# Patient Record
Sex: FEMALE | Race: WHITE | NOT HISPANIC OR LATINO | Employment: OTHER | URBAN - METROPOLITAN AREA
[De-identification: names, ages, dates, MRNs, and addresses within clinical notes are randomized per-mention and may not be internally consistent; named-entity substitution may affect disease eponyms.]

---

## 2017-01-09 ENCOUNTER — TELEPHONE (OUTPATIENT)
Dept: FAMILY MEDICINE | Facility: CLINIC | Age: 47
End: 2017-01-09

## 2017-01-09 DIAGNOSIS — F43.0 ACUTE STRESS REACTION: Primary | ICD-10-CM

## 2017-01-09 RX ORDER — DULOXETIN HYDROCHLORIDE 30 MG/1
CAPSULE, DELAYED RELEASE ORAL
Qty: 30 CAPSULE | Refills: 5 | Status: SHIPPED | OUTPATIENT
Start: 2017-01-09 | End: 2017-11-20

## 2017-01-09 NOTE — TELEPHONE ENCOUNTER
I will order a low dose cymbalta to take daily for 1 week, then every other day for 1 week, then off

## 2017-01-09 NOTE — TELEPHONE ENCOUNTER
Pt calling in,  Skipped taking Cymbalta past week 5 times due to weight gain, gained about 25 lbs past yr despite trying to eat better  Now feeling dizziness/vertigo, lightheaded, hard time focusing eyes, nausea, feels better when laying flat  Has gotten progressively worse  No depression    Wondering if there is anything that can help?    Route to SD to review and advise    Jossy Angulo RN, BS  Message handled by Nurse Triage .

## 2017-06-22 DIAGNOSIS — G47.00 PERSISTENT INSOMNIA: ICD-10-CM

## 2017-06-23 RX ORDER — ZOLPIDEM TARTRATE 5 MG/1
TABLET ORAL
Qty: 30 TABLET | Refills: 0 | Status: SHIPPED | OUTPATIENT
Start: 2017-06-23 | End: 2017-07-28

## 2017-06-23 NOTE — TELEPHONE ENCOUNTER
Controlled Substance Refill Request for zolpidem (AMBIEN) 5 MG tablet  Problem List Complete:  No     PROVIDER TO CONSIDER COMPLETION OF PROBLEM LIST AND OVERVIEW/CONTROLLED SUBSTANCE AGREEMENT    Last Written Prescription Date:  12/22/16  Last Fill Quantity: 30,   # refills: 3    Last Office Visit with [facility] primary care provider: 9/6/2016      Future Office visit:     Controlled substance agreement on file: No.     Processing:  Fax Rx to [facility] pharmacy   checked in past 6 months?  No, route to ZAYRA Franco  June 23, 2017  8:12 AM

## 2017-06-23 NOTE — TELEPHONE ENCOUNTER
Rx was faxed to [facility], pharmacy will notify patient when ready to be picked up.   Filed at  desk.     Palomo Jacobson   06/23/17

## 2017-06-23 NOTE — TELEPHONE ENCOUNTER
Routing refill request to provider for review/approval because:  Drug not on the [facility] refill protocol     Jossy Angulo RN, BS  Clinical Nurse Triage.

## 2017-07-28 DIAGNOSIS — G47.00 PERSISTENT INSOMNIA: ICD-10-CM

## 2017-07-28 RX ORDER — ZOLPIDEM TARTRATE 5 MG/1
TABLET ORAL
Qty: 30 TABLET | Refills: 0 | Status: SHIPPED | OUTPATIENT
Start: 2017-07-28 | End: 2017-08-29

## 2017-07-28 NOTE — TELEPHONE ENCOUNTER
Rx was faxed to [facility], pharmacy will notify patient when ready to be picked up.   Filed at  desk.     Palomo Jacobson   07/28/17

## 2017-07-28 NOTE — TELEPHONE ENCOUNTER
zolpidem (AMBIEN) 5 MG tablet      Last Written Prescription Date:  6/23/17  Last Fill Quantity: 30,   # refills: 0  Last Office Visit with [facility] prescribing provider: 9/6/2016    Future Office visit:       Routing refill request to provider for review/approval because:  Drug not on the [facility], [facility] or [facility] refill protocol or controlled substance    ZAYRA Jay  July 28, 2017  8:30 AM

## 2017-08-29 DIAGNOSIS — G47.00 PERSISTENT INSOMNIA: ICD-10-CM

## 2017-08-29 NOTE — TELEPHONE ENCOUNTER
zolpidem (AMBIEN) 5 MG tablet      Last Written Prescription Date:  7/28/17  Last Fill Quantity: 30,   # refills: 0  Last Office Visit with [facility] or [facility] prescribing provider: 9/6/2016    Future Office visit:       Routing refill request to provider for review/approval because:  Drug not on the [facility], [facility] or [facility] refill protocol or controlled substance    ZAYRA Jay  August 29, 2017  11:04 AM

## 2017-08-30 RX ORDER — ZOLPIDEM TARTRATE 5 MG/1
TABLET ORAL
Qty: 30 TABLET | Refills: 0 | Status: SHIPPED | OUTPATIENT
Start: 2017-08-30 | End: 2017-10-26

## 2017-09-07 DIAGNOSIS — G47.00 PERSISTENT INSOMNIA: ICD-10-CM

## 2017-09-07 RX ORDER — ZOLPIDEM TARTRATE 5 MG/1
TABLET ORAL
Qty: 30 TABLET | Refills: 0 | OUTPATIENT
Start: 2017-09-07

## 2017-09-07 NOTE — TELEPHONE ENCOUNTER
Pt called screaming at RN with pharmacy on another phone regarding refill on ambien.  Writer informed patient that she doesn't need to be rude and yell with this.  Pharmacy was given a verbal for rx as this was taken care of on 8/30/17.    Lucía Hector RN, BSN

## 2017-10-26 DIAGNOSIS — G47.00 PERSISTENT INSOMNIA: ICD-10-CM

## 2017-10-26 NOTE — TELEPHONE ENCOUNTER
zolpidem (AMBIEN) 5 MG tablet      Last Written Prescription Date:  8/30/17  Last Fill Quantity: 30,   # refills: 0  Future Office visit:   9/6/2016    From last Refill 9/7/17, was refused:  Lucía Hector RN      9/7/17 10:40 AM   Note      Pt called screaming at RN with pharmacy on another phone regarding refill on ambien.  Writer informed patient that she doesn't need to be rude and yell with this.  Pharmacy was given a verbal for rx as this was taken care of on 8/30/17.     Lucía Hector RN, BSN          Routing refill request to provider for review/approval because:  Drug not on the [facility], [facility] or [facility] refill protocol or controlled substance      ZAYRA Jay  October 26, 2017  10:17 AM

## 2017-10-26 NOTE — TELEPHONE ENCOUNTER
checked 10/26/2017:    09/07/2017 ZOLPIDEM TARTRATE 5 MG TABLET 30.00   07/28/2017 ZOLPIDEM TARTRATE 5 MG TABLET 30.00   06/23/2017 ZOLPIDEM TARTRATE 5 MG TABLET 30.00   05/12/2017 ZOLPIDEM TARTRATE 5 MG TABLET 30.00   02/27/2017 ZOLPIDEM TARTRATE 5 MG TABLET 30.00   01/25/2017 ZOLPIDEM TARTRATE 5 MG TABLET 30.00   12/22/2016 ZOLPIDEM TARTRATE 5 MG TABLET 30.00   12/15/2016 LORAZEPAM 1 MG TABLET 5.00   11/11/2016 ZOLPIDEM TARTRATE 5 MG TABLET 30.00     Amanda Salas RN

## 2017-10-27 PROBLEM — F13.20 SEDATIVE, HYPNOTIC OR ANXIOLYTIC DEPENDENCE (H): Status: ACTIVE | Noted: 2017-10-27

## 2017-10-27 RX ORDER — ZOLPIDEM TARTRATE 5 MG/1
TABLET ORAL
Qty: 30 TABLET | Refills: 3 | Status: SHIPPED | OUTPATIENT
Start: 2017-10-27 | End: 2017-11-20

## 2017-10-27 NOTE — TELEPHONE ENCOUNTER
Rx was faxed to [facility], pharmacy will notify patient when ready to be picked up.     Palomo Jacobson   10/27/17 1:50 PM

## 2017-11-20 ENCOUNTER — OFFICE VISIT (OUTPATIENT)
Dept: FAMILY MEDICINE | Facility: CLINIC | Age: 47
End: 2017-11-20
Payer: COMMERCIAL

## 2017-11-20 VITALS
OXYGEN SATURATION: 100 % | SYSTOLIC BLOOD PRESSURE: 122 MMHG | BODY MASS INDEX: 22.66 KG/M2 | DIASTOLIC BLOOD PRESSURE: 88 MMHG | RESPIRATION RATE: 16 BRPM | TEMPERATURE: 98.1 F | HEART RATE: 98 BPM | WEIGHT: 141 LBS | HEIGHT: 66 IN

## 2017-11-20 DIAGNOSIS — G47.00 PERSISTENT INSOMNIA: ICD-10-CM

## 2017-11-20 DIAGNOSIS — M25.512 LEFT ANTERIOR SHOULDER PAIN: ICD-10-CM

## 2017-11-20 DIAGNOSIS — B00.1 COLD SORE: ICD-10-CM

## 2017-11-20 DIAGNOSIS — F33.1 MAJOR DEPRESSIVE DISORDER, RECURRENT EPISODE, MODERATE (H): Primary | ICD-10-CM

## 2017-11-20 DIAGNOSIS — Z23 NEED FOR PROPHYLACTIC VACCINATION AND INOCULATION AGAINST INFLUENZA: ICD-10-CM

## 2017-11-20 PROCEDURE — 90471 IMMUNIZATION ADMIN: CPT | Performed by: FAMILY MEDICINE

## 2017-11-20 PROCEDURE — 90686 IIV4 VACC NO PRSV 0.5 ML IM: CPT | Performed by: FAMILY MEDICINE

## 2017-11-20 PROCEDURE — 99214 OFFICE O/P EST MOD 30 MIN: CPT | Mod: 25 | Performed by: FAMILY MEDICINE

## 2017-11-20 RX ORDER — ZOLPIDEM TARTRATE 5 MG/1
5 TABLET ORAL AT BEDTIME
Qty: 30 TABLET | Refills: 3 | Status: SHIPPED | OUTPATIENT
Start: 2017-11-20 | End: 2018-06-26

## 2017-11-20 RX ORDER — CYCLOBENZAPRINE HCL 5 MG
TABLET ORAL
Qty: 90 TABLET | Refills: 3 | Status: SHIPPED | OUTPATIENT
Start: 2017-11-20 | End: 2018-10-04

## 2017-11-20 RX ORDER — ACYCLOVIR 400 MG/1
400 TABLET ORAL 3 TIMES DAILY
Qty: 15 TABLET | Refills: 3 | Status: SHIPPED | OUTPATIENT
Start: 2017-11-20 | End: 2018-10-04

## 2017-11-20 RX ORDER — LORAZEPAM 1 MG/1
1 TABLET ORAL EVERY 6 HOURS PRN
Qty: 10 TABLET | Refills: 0 | Status: SHIPPED | OUTPATIENT
Start: 2017-11-20 | End: 2018-10-04

## 2017-11-20 ASSESSMENT — ANXIETY QUESTIONNAIRES
5. BEING SO RESTLESS THAT IT IS HARD TO SIT STILL: NEARLY EVERY DAY
7. FEELING AFRAID AS IF SOMETHING AWFUL MIGHT HAPPEN: MORE THAN HALF THE DAYS
GAD7 TOTAL SCORE: 19
2. NOT BEING ABLE TO STOP OR CONTROL WORRYING: MORE THAN HALF THE DAYS
3. WORRYING TOO MUCH ABOUT DIFFERENT THINGS: NEARLY EVERY DAY
1. FEELING NERVOUS, ANXIOUS, OR ON EDGE: NEARLY EVERY DAY
6. BECOMING EASILY ANNOYED OR IRRITABLE: NEARLY EVERY DAY

## 2017-11-20 ASSESSMENT — PATIENT HEALTH QUESTIONNAIRE - PHQ9
SUM OF ALL RESPONSES TO PHQ QUESTIONS 1-9: 22
5. POOR APPETITE OR OVEREATING: NEARLY EVERY DAY

## 2017-11-20 ASSESSMENT — PAIN SCALES - GENERAL: PAINLEVEL: NO PAIN (0)

## 2017-11-20 NOTE — MR AVS SNAPSHOT
After Visit Summary   11/20/2017    Narda Guerrero    MRN: [id number]           Patient Information     Date Of Birth          1970        Visit Information        Provider Department      11/20/2017 9:00 AM Pricilla Bob MD [facility]        Today's Diagnoses     Left anterior shoulder pain    -  1    Persistent insomnia        Cold sore        Need for prophylactic vaccination and inoculation against influenza        Major depressive disorder, recurrent episode, moderate (H)          Care Instructions    Ok to use e-visits or phone visits if no insurance          Follow-ups after your visit        Follow-up notes from your care team     Return in about 4 weeks (around 12/18/2017).      Who to contact     If you have questions or need follow up information about today's clinic visit or your schedule please contact [facility] directly at [phone].  Normal or non-critical lab and imaging results will be communicated to you by MyChart, letter or phone within 4 business days after the clinic has received the results. If you do not hear from us within 7 days, please contact the clinic through [employer] or phone. If you have a critical or abnormal lab result, we will notify you by phone as soon as possible.  Submit refill requests through [employer] or call your pharmacy and they will forward the refill request to us. Please allow 3 business days for your refill to be completed.          Additional Information About Your Visit        [employer] Information     [employer] gives you secure access to your electronic health record. If you see a primary care provider, you can also send messages to your care team and make appointments. If you have questions, please call your primary care clinic.  If you do not have a primary care provider, please call [phone] and they will assist you.        Care EveryWhere ID     This is your Care EveryWhere ID. This could be  "used by other organizations to access your [facility] medical records  [id number]        Your Vitals Were     Pulse Temperature Respirations Height Pulse Oximetry BMI (Body Mass Index)    98 98.1  F (36.7  C) (Oral) 16 5' 5.5\" (1.664 m) 100% 23.11 kg/m2       Blood Pressure from Last 3 Encounters:   11/20/17 122/88   09/06/16 120/80   10/02/15 100/82    Weight from Last 3 Encounters:   11/20/17 141 lb (64 kg)   09/06/16 152 lb (68.9 kg)   10/02/15 134 lb 9.6 oz (61.1 kg)              We Performed the Following     HC FLU VAC PRESRV FREE QUAD SPLIT VIR 3+YRS IM  [13732]     Vaccine Administration, Initial [35242]          Today's Medication Changes          These changes are accurate as of: 11/20/17  9:27 AM.  If you have any questions, ask your nurse or doctor.               Start taking these medicines.        Dose/Directions    sertraline 50 MG tablet   Commonly known as:  ZOLOFT   Used for:  Major depressive disorder, recurrent episode, moderate (H)   Started by:  Pricilla Bob MD        Take 1/2 tablet (25 mg) for 1-2 weeks, then increase to 1 tablet orally daily   Quantity:  30 tablet   Refills:  0         These medicines have changed or have updated prescriptions.        Dose/Directions    cyclobenzaprine 5 MG tablet   Commonly known as:  FLEXERIL   This may have changed:  See the new instructions.   Used for:  Persistent insomnia   Changed by:  Pricilla Bob MD        Take 1 tablet by mouth at  bedtime   Quantity:  90 tablet   Refills:  3       LORazepam 1 MG tablet   Commonly known as:  ATIVAN   This may have changed:    - how much to take  - how to take this  - when to take this  - reasons to take this  - additional instructions   Used for:  Persistent insomnia   Changed by:  Pricilla Bob MD        Dose:  1 mg   Take 1 tablet (1 mg) by mouth every 6 hours as needed for anxiety And/ orTake 1 tablet prior to dental work.   Quantity:  10 tablet   Refills:  0       zolpidem 5 MG " tablet   Commonly known as:  AMBIEN   This may have changed:  See the new instructions.   Used for:  Persistent insomnia   Changed by:  Pricilla Bob MD        Dose:  5 mg   Take 1 tablet (5 mg) by mouth At Bedtime   Quantity:  30 tablet   Refills:  3            Where to get your medicines      These medications were sent to [facility]/pharmacy #[id number] - [place], [place] - [address]  [address], [facility] [address]     Phone:  [phone]     acyclovir 400 MG tablet    cyclobenzaprine 5 MG tablet    sertraline 50 MG tablet         Some of these will need a paper prescription and others can be bought over the counter.  Ask your nurse if you have questions.     Bring a paper prescription for each of these medications     LORazepam 1 MG tablet    zolpidem 5 MG tablet                Primary Care Provider Office Phone # Fax #    Pricilla Bob -[phone]       [id number]   [facility] [address]        Equal Access to Services     [facility] AH: Hadii aad ku hadasho Soomaali, waaxda luqadaha, qaybta kaalmada adeegyada, waxay idiin hayaan cindi niño . So [facility] [phone].    ATENCIÓN: Si habla español, tiene a lopez disposición servicios gratuitos de asistencia lingüística. [facility] [phone].    We comply with applicable federal civil rights laws and [place] laws. We do not discriminate on the basis of race, color, national origin, age, disability, sex, sexual orientation, or gender identity.            Thank you!     Thank you for choosing [facility]  for your care. Our goal is always to provide you with excellent care. Hearing back from our patients is one way we can continue to improve our services. Please take a few minutes to complete the written survey that you may receive in the mail after your visit with us. Thank you!             Your Updated Medication List - Protect others around you: Learn how to safely use, store and throw  away your medicines at www.disposemymeds.org.          This list is accurate as of: 11/20/17  9:27 AM.  Always use your most recent med list.                   Brand Name Dispense Instructions for use Diagnosis    acyclovir 400 MG tablet    ZOVIRAX    15 tablet    Take 1 tablet (400 mg) by mouth 3 times daily    Cold sore       cyclobenzaprine 5 MG tablet    FLEXERIL    90 tablet    Take 1 tablet by mouth at  bedtime    Persistent insomnia       fexofenadine 180 MG tablet    ALLEGRA     Take 180 mg by mouth daily.        LORazepam 1 MG tablet    ATIVAN    10 tablet    Take 1 tablet (1 mg) by mouth every 6 hours as needed for anxiety And/ orTake 1 tablet prior to dental work.    Persistent insomnia       sertraline 50 MG tablet    ZOLOFT    30 tablet    Take 1/2 tablet (25 mg) for 1-2 weeks, then increase to 1 tablet orally daily    Major depressive disorder, recurrent episode, moderate (H)       zolpidem 5 MG tablet    AMBIEN    30 tablet    Take 1 tablet (5 mg) by mouth At Bedtime    Persistent insomnia

## 2017-11-20 NOTE — NURSING NOTE
"Chief Complaint   Patient presents with     MOOD CHANGES     Refill Request     Flu Shot     Initial /88 (BP Location: Right arm, Patient Position: Chair, Cuff Size: Adult Regular)  Pulse 98  Temp 98.1  F (36.7  C) (Oral)  Resp 16  Ht 5' 5.5\" (1.664 m)  Wt 141 lb (64 kg)  SpO2 100%  BMI 23.11 kg/m2 Estimated body mass index is 23.11 kg/(m^2) as calculated from the following:    Height as of this encounter: 5' 5.5\" (1.664 m).    Weight as of this encounter: 141 lb (64 kg).  BP completed using cuff size regular right arm.    Lisa Magill, CMA    "

## 2017-11-21 ASSESSMENT — ANXIETY QUESTIONNAIRES: GAD7 TOTAL SCORE: 19

## 2017-11-29 ENCOUNTER — TELEPHONE (OUTPATIENT)
Dept: FAMILY MEDICINE | Facility: CLINIC | Age: 47
End: 2017-11-29

## 2017-12-02 ENCOUNTER — TRANSFERRED RECORDS (OUTPATIENT)
Dept: HEALTH INFORMATION MANAGEMENT | Facility: CLINIC | Age: 47
End: 2017-12-02

## 2018-01-19 ENCOUNTER — TELEPHONE (OUTPATIENT)
Dept: FAMILY MEDICINE | Facility: CLINIC | Age: 48
End: 2018-01-19

## 2018-01-19 NOTE — LETTER
[facility]  [address] [place] [address], [address]  [facility] [id number] [phone]  January 31, 2018    Narda ANDREW Guerrero  [address]  [facility] [address]      Dear Narda,    I care about your health and have reviewed your health plan.  I have reviewed your medical conditions, medication list, and lab results and am making recommendations  based on this review, to better manage your health.    You are particularly in need of attention regarding:  -Depression, -Cervical Cancer Screening and -Wellness (Physical) Visit    I am recommending that you:  -schedule a WELLNESS (Physical) APPOINTMENT with me.   (If you go elsewhere for Wellness visits then please disregard this reminder.)      Here is a list of Health Maintenance topics that are due now or due soon:  Health Maintenance Due   Topic Date Due     DEPRESSION ACTION PLAN Q1 YR  09/06/2017     PAP Q3 YR  07/28/2018       Please call us at [phone] (or use [employer]) to address the above   recommendations.    Thank you for trusting [facility] and we appreciate the opportunity to serve you.  We look forward to supporting your healthcare needs in the future.    Healthy Regards,    Pricilla Bob MD

## 2018-01-19 NOTE — TELEPHONE ENCOUNTER
Panel Management Review      Patient has the following on her problem list:     Depression / Dysthymia review    Measure:  Needs PHQ-9 score of 4 or less during index window.  Administer PHQ-9 and if score is 5 or more, send encounter to provider for next steps.    5 - 7 month window range: DUE NOW    PHQ-9 SCORE 10/2/2015 4/14/2016 11/20/2017   Total Score - - -   Total Score 22 20 22       If PHQ-9 recheck is 5 or more, route to provider for next steps.    Patient is due for:  PHQ-9 and SUPRIYA-7        Composite cancer screening  Chart review shows that this patient is due/due soon for the following None  Summary:    Patient is due/failing the following:   FOLLOW UP    Action needed:   Patient needs office visit for DEPRESSION.    Type of outreach:    Sent [employer] message.    Questions for provider review:    None                                                                                                                                    Lisa Magill, CMA       Chart routed to Care Team .

## 2018-05-18 ENCOUNTER — TELEPHONE (OUTPATIENT)
Dept: FAMILY MEDICINE | Facility: CLINIC | Age: 48
End: 2018-05-18

## 2018-05-18 NOTE — LETTER
[facility]  [address] [place] [address], [address]  [facility] [id number] [phone]  May 25, 2018    Narda Guerrero  [address]  [facility] [address]      Dear Narda,    I care about your health and have reviewed your health plan.  I have reviewed your medical conditions, medication list, and lab results and am making recommendations  based on this review, to better manage your health.    You are particularly in need of attention regarding:  -Depression, -Cervical Cancer Screening and -Wellness (Physical) Visit    I am recommending that you:  -schedule a WELLNESS (Physical) APPOINTMENT with me.          Here is a list of Health Maintenance topics that are due now or due soon:  Health Maintenance Due   Topic Date Due     HIV SCREEN (SYSTEM ASSIGNED)  07/08/1990     DEPRESSION ACTION PLAN Q1 YR  09/06/2017     PHQ-9 Q6 MONTHS  05/20/2018     PAP Q3 YR  07/28/2018       Please call us at [phone] (or use [employer]) to address the above   recommendations.    Thank you for trusting [facility] and we appreciate the opportunity to serve you.  We look forward to supporting your healthcare needs in the future.    Healthy Regards,     Pricilla Bob MD

## 2018-05-18 NOTE — TELEPHONE ENCOUNTER
Panel Management Review      Patient has the following on her problem list:     Depression / Dysthymia review    Measure:  Needs PHQ-9 score of 4 or less during index window.  Administer PHQ-9 and if score is 5 or more, send encounter to provider for next steps.    5 - 7 month window range: PHQ-9 due NOW     PHQ-9 SCORE 10/2/2015 4/14/2016 11/20/2017   Total Score - - -   Total Score 22 20 22       If PHQ-9 recheck is 5 or more, route to provider for next steps.    Patient is due for:  PHQ9      Composite cancer screening  Chart review shows that this patient is due/due soon for the following Pap Smear due 07/28/18   Summary:    Patient is due/failing the following:   PAP, PHQ9 and PHYSICAL    Action needed:   Patient needs office visit for physical and pap.  Also needs to do PHQ9.    Type of outreach:    Attempted calling patient, NO ANSWER and UNABLE TO LEAVE A MESSAGE.  Will try again later.      Questions for provider review:    None                                                                                                                                    Lisa Magill, CMA       Chart routed to Care Team .

## 2018-06-18 DIAGNOSIS — G47.00 PERSISTENT INSOMNIA: ICD-10-CM

## 2018-06-18 NOTE — TELEPHONE ENCOUNTER
zolpidem (AMBIEN) 5 MG tablet  Last Written Prescription Date:  11/20/17  Last Fill Quantity: 30,  # refills: 3   Last office visit: 11/20/2017 with prescribing provider:  Paulino Samson refill request to provider for review/approval because:  Drug not on the [facility], [facility] or [facility] refill protocol or controlled substance

## 2018-06-21 RX ORDER — ZOLPIDEM TARTRATE 5 MG/1
5 TABLET ORAL AT BEDTIME
Qty: 30 TABLET | Refills: 3 | OUTPATIENT
Start: 2018-06-21

## 2018-06-26 ENCOUNTER — VIRTUAL VISIT (OUTPATIENT)
Dept: FAMILY MEDICINE | Facility: CLINIC | Age: 48
End: 2018-06-26
Payer: COMMERCIAL

## 2018-06-26 DIAGNOSIS — G47.00 PERSISTENT INSOMNIA: ICD-10-CM

## 2018-06-26 PROCEDURE — 99441 ZZC PHYSICIAN TELEPHONE EVALUATION 5-10 MIN: CPT | Performed by: FAMILY MEDICINE

## 2018-06-26 RX ORDER — DIPHENHYDRAMINE HCL 25 MG
25 TABLET ORAL EVERY 6 HOURS PRN
COMMUNITY
End: 2021-11-29

## 2018-06-26 RX ORDER — ZOLPIDEM TARTRATE 5 MG/1
5 TABLET ORAL AT BEDTIME
Qty: 30 TABLET | Refills: 3 | Status: SHIPPED | OUTPATIENT
Start: 2018-06-26 | End: 2018-10-04

## 2018-06-26 ASSESSMENT — ANXIETY QUESTIONNAIRES
6. BECOMING EASILY ANNOYED OR IRRITABLE: MORE THAN HALF THE DAYS
1. FEELING NERVOUS, ANXIOUS, OR ON EDGE: SEVERAL DAYS
3. WORRYING TOO MUCH ABOUT DIFFERENT THINGS: MORE THAN HALF THE DAYS
GAD7 TOTAL SCORE: 11
7. FEELING AFRAID AS IF SOMETHING AWFUL MIGHT HAPPEN: SEVERAL DAYS
5. BEING SO RESTLESS THAT IT IS HARD TO SIT STILL: SEVERAL DAYS
IF YOU CHECKED OFF ANY PROBLEMS ON THIS QUESTIONNAIRE, HOW DIFFICULT HAVE THESE PROBLEMS MADE IT FOR YOU TO DO YOUR WORK, TAKE CARE OF THINGS AT HOME, OR GET ALONG WITH OTHER PEOPLE: SOMEWHAT DIFFICULT
2. NOT BEING ABLE TO STOP OR CONTROL WORRYING: MORE THAN HALF THE DAYS

## 2018-06-26 ASSESSMENT — PATIENT HEALTH QUESTIONNAIRE - PHQ9: 5. POOR APPETITE OR OVEREATING: MORE THAN HALF THE DAYS

## 2018-06-26 NOTE — LETTER
My Depression Action Plan  Name: Narda Guerrero   Date of Birth 1970  Date: 6/26/2018    My doctor: Pricilla Bob   My clinic: [address], [address]  [facility] [address] [phone]          GREEN    ZONE   Good Control    What it looks like:     Things are going generally well. You have normal up s and down s. You may even feel depressed from time to time, but bad moods usually last less than a day.   What you need to do:  1. Continue to care for yourself (see self care plan)  2. Check your depression survival kit and update it as needed  3. Follow your physician s recommendations including any medication.  4. Do not stop taking medication unless you consult with your physician first.           YELLOW         ZONE Getting Worse    What it looks like:     Depression is starting to interfere with your life.     It may be hard to get out of bed; you may be starting to isolate yourself from others.    Symptoms of depression are starting to last most all day and this has happened for several days.     You may have suicidal thoughts but they are not constant.   What you need to do:     1. Call your care team, your response to treatment will improve if you keep your care team informed of your progress. Yellow periods are signs an adjustment may need to be made.     2. Continue your self-care, even if you have to fake it!    3. Talk to someone in your support network    4. Open up your depression survival kit           RED    ZONE Medical Alert - Get Help    What it looks like:     Depression is seriously interfering with your life.     You may experience these or other symptoms: You can t get out of bed most days, can t work or engage in other necessary activities, you have trouble taking care of basic hygiene, or basic responsibilities, thoughts of suicide or death that will not go away, self-injurious behavior.     What you need to do:  1. Call  your care team and request a same-day appointment. If they are not available (weekends or after hours) call your local crisis line, emergency room or 911.            Depression Self Care Plan / Survival Kit    Self-Care for Depression  Here s the deal. Your body and mind are really not as separate as most people think.  What you do and think affects how you feel and how you feel influences what you do and think. This means if you do things that people who feel good do, it will help you feel better.  Sometimes this is all it takes.  There is also a place for medication and therapy depending on how severe your depression is, so be sure to consult with your medical provider and/ or Behavioral Health Consultant if your symptoms are worsening or not improving.     In order to better manage my stress, I will:    Exercise  Get some form of exercise, every day. This will help reduce pain and release endorphins, the  feel good  chemicals in your brain. This is almost as good as taking antidepressants!  This is not the same as joining a gym and then never going! (they count on that by the way ) It can be as simple as just going for a walk or doing some gardening, anything that will get you moving.      Hygiene   Maintain good hygiene (Get out of bed in the morning, Make your bed, Brush your teeth, Take a shower, and Get dressed like you were going to work, even if you are unemployed).  If your clothes don't fit try to get ones that do.    Diet  I will strive to eat foods that are good for me, drink plenty of water, and avoid excessive sugar, caffeine, alcohol, and other mood-altering substances.  Some foods that are helpful in depression are: complex carbohydrates, B vitamins, flaxseed, fish or fish oil, fresh fruits and vegetables.    Psychotherapy  I agree to participate in Individual Therapy (if recommended).    Medication  If prescribed medications, I agree to take them.  Missing doses can result in serious side effects.   I understand that drinking alcohol, or other illicit drug use, may cause potential side effects.  I will not stop my medication abruptly without first discussing it with my provider.    Staying Connected With Others  I will stay in touch with my friends, family members, and my primary care provider/team.    Use your imagination  Be creative.  We all have a creative side; it doesn t matter if it s oil painting, sand castles, or mud pies! This will also kick up the endorphins.    Witness Beauty  (AKA stop and smell the roses) Take a look outside, even in mid-winter. Notice colors, textures. Watch the squirrels and birds.     Service to others  Be of service to others.  There is always someone else in need.  By helping others we can  get out of ourselves  and remember the really important things.  This also provides opportunities for practicing all the other parts of the program.    Humor  Laugh and be silly!  Adjust your TV habits for less news and crime-drama and more comedy.    Control your stress  Try breathing deep, massage therapy, biofeedback, and meditation. Find time to relax each day.     My support system    Clinic Contact:  Phone number:    Contact 1:  Phone number:    Contact 2:  Phone number:    [place]/:  Phone number:    Therapist:  Phone number:    [facility] crisis center:    Phone number:    Other community support:  Phone number:

## 2018-06-26 NOTE — MR AVS SNAPSHOT
After Visit Summary   6/26/2018    Narda Guerrero    MRN: [id number]           Patient Information     Date Of Birth          1970        Visit Information        Provider Department      6/26/2018 9:00 AM Pricilla Bob MD [facility]        Today's Diagnoses     Persistent insomnia           Follow-ups after your visit        Follow-up notes from your care team     Return in about 3 months (around 9/26/2018).      Who to contact     If you have questions or need follow up information about today's clinic visit or your schedule please contact [facility] directly at [phone].  Normal or non-critical lab and imaging results will be communicated to you by [employer], letter or phone within 4 business days after the clinic has received the results. If you do not hear from us within 7 days, please contact the clinic through [employer] or phone. If you have a critical or abnormal lab result, we will notify you by phone as soon as possible.  Submit refill requests through [employer] or call your pharmacy and they will forward the refill request to us. Please allow 3 business days for your refill to be completed.          Additional Information About Your Visit        MyChart Information     [employer] gives you secure access to your electronic health record. If you see a primary care provider, you can also send messages to your care team and make appointments. If you have questions, please call your primary care clinic.  If you do not have a primary care provider, please call [phone] and they will assist you.        Care EveryWhere ID     This is your Care EveryWhere ID. This could be used by other organizations to access your [place] medical records  [id number]         Blood Pressure from Last 3 Encounters:   11/20/17 122/88   09/06/16 120/80   10/02/15 100/82    Weight from Last 3 Encounters:   11/20/17 141 lb (64 kg)   09/06/16 152 lb (68.9 kg)   10/02/15  134 lb 9.6 oz (61.1 kg)              Today, you had the following     No orders found for display         Where to get your medicines      Some of these will need a paper prescription and others can be bought over the counter.  Ask your nurse if you have questions.     Bring a paper prescription for each of these medications     zolpidem 5 MG tablet          Primary Care Provider Office Phone # Fax #    Pricilla Dianelys Bob [phone]       [id number]  [address]   [facility] [address]        Equal Access to Services     KELSI MONIQUE : Hadii aad ku hadasho Soomaali, waaxda luqadaha, qaybta kaalmada adeegyada, waxay idiin hayaan cindi niño . So [facility] [phone].    ATENCIÓN: Si habla español, tiene a lopez disposición servicios gratuitos de asistencia lingüística. Llame al [phone].    We comply with applicable federal civil rights laws and [place] laws. We do not discriminate on the basis of race, color, national origin, age, disability, sex, sexual orientation, or gender identity.            Thank you!     Thank you for choosing [facility]  for your care. Our goal is always to provide you with excellent care. Hearing back from our patients is one way we can continue to improve our services. Please take a few minutes to complete the written survey that you may receive in the mail after your visit with us. Thank you!             Your Updated Medication List - Protect others around you: Learn how to safely use, store and throw away your medicines at www.disposemymeds.org.          This list is accurate as of 6/26/18  9:31 AM.  Always use your most recent med list.                   Brand Name Dispense Instructions for use Diagnosis    acyclovir 400 MG tablet    ZOVIRAX    15 tablet    Take 1 tablet (400 mg) by mouth 3 times daily    Cold sore       BENADRYL 25 MG tablet   Generic drug:  diphenhydrAMINE      Take 25 mg by mouth every 6 hours as needed for itching or  allergies        cyclobenzaprine 5 MG tablet    FLEXERIL    90 tablet    Take 1 tablet by mouth at  bedtime    Persistent insomnia       LORazepam 1 MG tablet    ATIVAN    10 tablet    Take 1 tablet (1 mg) by mouth every 6 hours as needed for anxiety And/ orTake 1 tablet prior to dental work.    Persistent insomnia       zolpidem 5 MG tablet    AMBIEN    30 tablet    Take 1 tablet (5 mg) by mouth At Bedtime    Persistent insomnia

## 2018-06-26 NOTE — PROGRESS NOTES
"Narda Guerrero is a 47 year old female who is being evaluated via a telephone visit.      The patient has been notified of following:     \"This telephone visit will be conducted via a call between you and your physician/provider. We have found that certain health care needs can be provided without the need for a physical exam.  This service lets us provide the care you need with a short phone conversation.  If a prescription is necessary we can send it directly to your pharmacy.  If lab work is needed we can place an order for that and you can then stop by our lab to have the test done at a later time.    We will bill your insurance company for this service.  Please check with your medical insurance if this type of visit is covered. You may be responsible for the cost of this type of visit if insurance coverage is denied.  The typical cost is $30 (10min), $59 (11-20min) and $85 (21-30min).  Most often these visits are shorter than 10 minutes.    If during the course of the call the physician/provider feels a telephone visit is not appropriate, you will not be charged for this service.\"       Consent has been obtained for this service by care team member: yes.   See the scanned image in the medical record.    Narda Guerrero complains of  Telephone and Recheck Medication (Ambien)    PHQ-9 SCORE 4/14/2016 11/20/2017 6/26/2018   Total Score - - -   Total Score 20 22 22     SUPRIYA-7 SCORE 4/14/2016 11/20/2017 6/26/2018   Total Score - - -   Total Score 14 19 11       I have reviewed and updated the patient's Past Medical History, Social History, Family History and Medication List.    ALLERGIES  Ortho tri-cyclen [ortho tri-cyclen lo] and Sulfa drugs    April Cox MA   (MA signature)    Additional provider notes: pt using ambien most nights, taking 1/3 to 1/2 tab only. This helps her sleep and sometimes will need to repeat the tab in the middle fo the night, using 5mg tabs to begin with.  Flexeril is sometimes " used as well. No caffeine use. No other concerns.  Insurance is now very high deductible and has no HSA. Due for pap next month    Assessment/Plan:  (G47.00) Persistent insomnia  Comment: refilled medication. Careful to not take this to late in the night, next day drowsiness can be a problem  Plan: zolpidem (AMBIEN) 5 MG tablet        Recommend Follow up for WWE in 3 months time, due for pap soon        I have reviewed the note as documented above.  This accurately captures the substance of my conversation with the patient,  [facility], MD          Total time of call between patient and provider was 5 minutes

## 2018-06-27 ASSESSMENT — ANXIETY QUESTIONNAIRES: GAD7 TOTAL SCORE: 11

## 2018-06-27 ASSESSMENT — PATIENT HEALTH QUESTIONNAIRE - PHQ9: SUM OF ALL RESPONSES TO PHQ QUESTIONS 1-9: 22

## 2018-09-10 ENCOUNTER — TELEPHONE (OUTPATIENT)
Dept: NURSING | Facility: CLINIC | Age: 48
End: 2018-09-10

## 2018-09-10 NOTE — TELEPHONE ENCOUNTER
Panel Management Review      Patient has the following on her problem list:     Depression / Dysthymia review    Measure:  Needs PHQ-9 score of 4 or less during index window.  Administer PHQ-9 and if score is 5 or more, send encounter to provider for next steps.    5 - 7 month window range: PHQ-9 and SUPRIYA-7 due NOW    PHQ-9 SCORE 4/14/2016 11/20/2017 6/26/2018   Total Score - - -   Total Score 20 22 22       If PHQ-9 recheck is 5 or more, route to provider for next steps.    Patient is due for:  PHQ9 and SUPRIYA-7      Composite cancer screening  Chart review shows that this patient is due/due soon for the following Pap Smear  Summary:    Patient is due/failing the following:   SUPRIYA-7, PAP, PHQ9 and PHYSICAL    Action needed:   Patient needs office visit for PHYSICAL AND PAP and also needs to do PHQ-9 and SUPRIYA-7.    Type of outreach:    Phone, spoke to patient.  assisted patient in making a physical and pap appointment on 10/04/18.     Questions for provider review:    None                                                                                                                                    Lisa Magill, CMA       Chart routed to Provider .

## 2018-09-29 ENCOUNTER — HEALTH MAINTENANCE LETTER (OUTPATIENT)
Age: 48
End: 2018-09-29

## 2018-10-04 ENCOUNTER — OFFICE VISIT (OUTPATIENT)
Dept: FAMILY MEDICINE | Facility: CLINIC | Age: 48
End: 2018-10-04
Payer: COMMERCIAL

## 2018-10-04 VITALS
HEART RATE: 80 BPM | SYSTOLIC BLOOD PRESSURE: 122 MMHG | WEIGHT: 147.3 LBS | HEIGHT: 66 IN | BODY MASS INDEX: 23.67 KG/M2 | RESPIRATION RATE: 12 BRPM | DIASTOLIC BLOOD PRESSURE: 86 MMHG | TEMPERATURE: 98.5 F

## 2018-10-04 DIAGNOSIS — B00.1 COLD SORE: ICD-10-CM

## 2018-10-04 DIAGNOSIS — F13.20 SEDATIVE, HYPNOTIC OR ANXIOLYTIC DEPENDENCE (H): ICD-10-CM

## 2018-10-04 DIAGNOSIS — E78.5 HYPERLIPIDEMIA WITH TARGET LDL LESS THAN 100: ICD-10-CM

## 2018-10-04 DIAGNOSIS — G47.00 PERSISTENT INSOMNIA: ICD-10-CM

## 2018-10-04 DIAGNOSIS — Z00.00 ROUTINE GENERAL MEDICAL EXAMINATION AT A HEALTH CARE FACILITY: Primary | ICD-10-CM

## 2018-10-04 DIAGNOSIS — Z23 NEED FOR PROPHYLACTIC VACCINATION AND INOCULATION AGAINST INFLUENZA: ICD-10-CM

## 2018-10-04 DIAGNOSIS — Z12.4 SCREENING FOR MALIGNANT NEOPLASM OF CERVIX: ICD-10-CM

## 2018-10-04 DIAGNOSIS — F33.2 SEVERE EPISODE OF RECURRENT MAJOR DEPRESSIVE DISORDER, WITHOUT PSYCHOTIC FEATURES (H): ICD-10-CM

## 2018-10-04 LAB
ALBUMIN SERPL-MCNC: 4.2 G/DL (ref 3.4–5)
ALP SERPL-CCNC: 108 U/L (ref 40–150)
ALT SERPL W P-5'-P-CCNC: 50 U/L (ref 0–50)
ANION GAP SERPL CALCULATED.3IONS-SCNC: 5 MMOL/L (ref 3–14)
AST SERPL W P-5'-P-CCNC: 28 U/L (ref 0–45)
BILIRUB SERPL-MCNC: 0.3 MG/DL (ref 0.2–1.3)
BUN SERPL-MCNC: 9 MG/DL (ref 7–30)
CALCIUM SERPL-MCNC: 8.9 MG/DL (ref 8.5–10.1)
CHLORIDE SERPL-SCNC: 104 MMOL/L (ref 94–109)
CHOLEST SERPL-MCNC: 251 MG/DL
CO2 SERPL-SCNC: 28 MMOL/L (ref 20–32)
CREAT SERPL-MCNC: 0.75 MG/DL (ref 0.52–1.04)
ERYTHROCYTE [DISTWIDTH] IN BLOOD BY AUTOMATED COUNT: 11.8 % (ref 10–15)
GFR SERPL CREATININE-BSD FRML MDRD: 83 ML/MIN/1.7M2
GLUCOSE SERPL-MCNC: 90 MG/DL (ref 70–99)
HCT VFR BLD AUTO: 46.8 % (ref 35–47)
HDLC SERPL-MCNC: 53 MG/DL
HGB BLD-MCNC: 15.5 G/DL (ref 11.7–15.7)
LDLC SERPL CALC-MCNC: 150 MG/DL
MCH RBC QN AUTO: 31.5 PG (ref 26.5–33)
MCHC RBC AUTO-ENTMCNC: 33.1 G/DL (ref 31.5–36.5)
MCV RBC AUTO: 95 FL (ref 78–100)
NONHDLC SERPL-MCNC: 198 MG/DL
PLATELET # BLD AUTO: 242 10E9/L (ref 150–450)
POTASSIUM SERPL-SCNC: 4.5 MMOL/L (ref 3.4–5.3)
PROT SERPL-MCNC: 8.2 G/DL (ref 6.8–8.8)
RBC # BLD AUTO: 4.92 10E12/L (ref 3.8–5.2)
SODIUM SERPL-SCNC: 137 MMOL/L (ref 133–144)
TRIGL SERPL-MCNC: 240 MG/DL
TSH SERPL DL<=0.005 MIU/L-ACNC: 1.3 MU/L (ref 0.4–4)
WBC # BLD AUTO: 6.2 10E9/L (ref 4–11)

## 2018-10-04 PROCEDURE — 80053 COMPREHEN METABOLIC PANEL: CPT | Performed by: FAMILY MEDICINE

## 2018-10-04 PROCEDURE — G0145 SCR C/V CYTO,THINLAYER,RESCR: HCPCS | Performed by: FAMILY MEDICINE

## 2018-10-04 PROCEDURE — 82306 VITAMIN D 25 HYDROXY: CPT | Performed by: FAMILY MEDICINE

## 2018-10-04 PROCEDURE — 80061 LIPID PANEL: CPT | Performed by: FAMILY MEDICINE

## 2018-10-04 PROCEDURE — 85027 COMPLETE CBC AUTOMATED: CPT | Performed by: FAMILY MEDICINE

## 2018-10-04 PROCEDURE — 36415 COLL VENOUS BLD VENIPUNCTURE: CPT | Performed by: FAMILY MEDICINE

## 2018-10-04 PROCEDURE — 99396 PREV VISIT EST AGE 40-64: CPT | Mod: 25 | Performed by: FAMILY MEDICINE

## 2018-10-04 PROCEDURE — 99213 OFFICE O/P EST LOW 20 MIN: CPT | Mod: 25 | Performed by: FAMILY MEDICINE

## 2018-10-04 PROCEDURE — 84443 ASSAY THYROID STIM HORMONE: CPT | Performed by: FAMILY MEDICINE

## 2018-10-04 PROCEDURE — 90471 IMMUNIZATION ADMIN: CPT | Performed by: FAMILY MEDICINE

## 2018-10-04 PROCEDURE — 87624 HPV HI-RISK TYP POOLED RSLT: CPT | Performed by: FAMILY MEDICINE

## 2018-10-04 PROCEDURE — 90686 IIV4 VACC NO PRSV 0.5 ML IM: CPT | Performed by: FAMILY MEDICINE

## 2018-10-04 RX ORDER — LORAZEPAM 1 MG/1
1 TABLET ORAL EVERY 6 HOURS PRN
Qty: 10 TABLET | Refills: 0 | Status: SHIPPED | OUTPATIENT
Start: 2018-10-04 | End: 2020-01-15

## 2018-10-04 RX ORDER — ACYCLOVIR 400 MG/1
400 TABLET ORAL 3 TIMES DAILY
Qty: 15 TABLET | Refills: 3 | Status: SHIPPED | OUTPATIENT
Start: 2018-10-04 | End: 2019-07-18

## 2018-10-04 RX ORDER — CYCLOBENZAPRINE HCL 5 MG
TABLET ORAL
Qty: 90 TABLET | Refills: 3 | Status: SHIPPED | OUTPATIENT
Start: 2018-10-04 | End: 2019-07-18

## 2018-10-04 RX ORDER — ZOLPIDEM TARTRATE 5 MG/1
5 TABLET ORAL AT BEDTIME
Qty: 30 TABLET | Refills: 3 | Status: SHIPPED | OUTPATIENT
Start: 2018-10-04 | End: 2019-07-18

## 2018-10-04 ASSESSMENT — PATIENT HEALTH QUESTIONNAIRE - PHQ9
SUM OF ALL RESPONSES TO PHQ QUESTIONS 1-9: 17
10. IF YOU CHECKED OFF ANY PROBLEMS, HOW DIFFICULT HAVE THESE PROBLEMS MADE IT FOR YOU TO DO YOUR WORK, TAKE CARE OF THINGS AT HOME, OR GET ALONG WITH OTHER PEOPLE: SOMEWHAT DIFFICULT
SUM OF ALL RESPONSES TO PHQ QUESTIONS 1-9: 17

## 2018-10-04 NOTE — MR AVS SNAPSHOT
After Visit Summary   10/4/2018    Narda Guerrero    MRN: [id number]           Patient Information     Date Of Birth          1970        Visit Information        Provider Department      10/4/2018 10:00 AM Pricilla Bob MD [facility]        Today's Diagnoses     Routine general medical examination at a health care facility    -  1    Sedative, hypnotic or anxiolytic dependence (H)        Need for prophylactic vaccination and inoculation against influenza        Persistent insomnia        Cold sore        Screening for malignant neoplasm of cervix        Severe episode of recurrent major depressive disorder, without psychotic features (H)          Care Instructions      Preventive Health Recommendations  Female Ages 40 to 49    Yearly exam:     See your health care provider every year in order to  1. Review health changes.   2. Discuss preventive care.    3. Review your medicines if your doctor prescribed any.      Get a Pap test every three years (unless you have an abnormal result and your provider advises testing more often).      If you get Pap tests with HPV test, you only need to test every 5 years, unless you have an abnormal result. You do not need a Pap test if your uterus was removed (hysterectomy) and you have not had cancer.      You should be tested each year for STDs (sexually transmitted diseases), if you're at risk.     Ask your doctor if you should have a mammogram.      Have a colonoscopy (test for colon cancer) if someone in your family has had colon cancer or polyps before age 50.       Have a cholesterol test every 5 years.       Have a diabetes test (fasting glucose) after age 45. If you are at risk for diabetes, you should have this test every 3 years.    Shots: Get a flu shot each year. Get a tetanus shot every 10 years.     Nutrition:     Eat at least 5 servings of fruits and vegetables each day.    Eat whole-grain bread, whole-wheat pasta and  brown rice instead of white grains and rice.    Get adequate Calcium and Vitamin D.      Lifestyle    Exercise at least 150 minutes a week (an average of 30 minutes a day, 5 days a week). This will help you control your weight and prevent disease.    Limit alcohol to one drink per day.    No smoking.     Wear sunscreen to prevent skin cancer.    See your dentist every six months for an exam and cleaning.          Follow-ups after your visit        Follow-up notes from your care team     Return in about 1 month (around 11/4/2018) for depression/anxiety medication recheck/evisit ok.      Future tests that were ordered for you today     Open Future Orders        Priority Expected Expires Ordered    MA Screening Digital Bilateral Routine  10/4/2019 10/4/2018            Who to contact     If you have questions or need follow up information about today's clinic visit or your schedule please contact [facility] directly at [phone].  Normal or non-critical lab and imaging results will be communicated to you by [employer], letter or phone within 4 business days after the clinic has received the results. If you do not hear from us within 7 days, please contact the clinic through [employer] or phone. If you have a critical or abnormal lab result, we will notify you by phone as soon as possible.  Submit refill requests through [employer] or call your pharmacy and they will forward the refill request to us. Please allow 3 business days for your refill to be completed.          Additional Information About Your Visit        [email or web address] Information     [employer] gives you secure access to your electronic health record. If you see a primary care provider, you can also send messages to your care team and make appointments. If you have questions, please call your primary care clinic.  If you do not have a primary care provider, please call [phone] and they will assist you.        Care EveryWhere ID     This is your Care  "EveryWhere ID. This could be used by other organizations to access your [place] medical records  [id number]        Your Vitals Were     Pulse Temperature Respirations Height Last Period BMI (Body Mass Index)    80 98.5  F (36.9  C) (Oral) 12 5' 6.25\" (1.683 m) (LMP Unknown) 23.6 kg/m2       Blood Pressure from Last 3 Encounters:   10/04/18 122/86   11/20/17 122/88   09/06/16 120/80    Weight from Last 3 Encounters:   10/04/18 147 lb 4.8 oz (66.8 kg)   11/20/17 141 lb (64 kg)   09/06/16 152 lb (68.9 kg)              We Performed the Following     CBC with platelets     Comprehensive metabolic panel     HC FLU VAC PRESRV FREE QUAD SPLIT VIR 3+YRS IM  [83604]     HPV High Risk Types DNA Cervical     Lipid panel reflex to direct LDL Fasting     Pap imaged thin layer screen with HPV - recommended age 30 - 65 years (select HPV order below)     TSH with free T4 reflex     Vaccine Administration, Initial [87451]     Vitamin D Deficiency          Today's Medication Changes          These changes are accurate as of 10/4/18 11:03 AM.  If you have any questions, ask your nurse or doctor.               Start taking these medicines.        Dose/Directions    FLUoxetine 20 MG capsule   Commonly known as:  PROzac   Used for:  Severe episode of recurrent major depressive disorder, without psychotic features (H)   Started by:  Pricilla Bob MD        Dose:  20 mg   Take 1 capsule (20 mg) by mouth daily   Quantity:  30 capsule   Refills:  1            Where to get your medicines      These medications were sent to [id number] - [place], [place] - [address]  [address] [facility] [address]     Phone:  [phone]     acyclovir 400 MG tablet    cyclobenzaprine 5 MG tablet         Some of these will need a paper prescription and others can be bought over the counter.  Ask your nurse if you have questions.     Bring a paper prescription for each of these medications     FLUoxetine 20 MG capsule    " LORazepam 1 MG tablet    zolpidem 5 MG tablet                Primary Care Provider Office Phone # Fax #    Pricilla Dianelys Bob [phone]       [id number]  [address]   [facility] [address]        Equal Access to Services     KELSI MONIQUE AH: Hadii aad ku hadasho Soomaali, waaxda luqadaha, qaybta kaalmada adeegyada, waxay idiin hayaan adeeg kharash renay prabhakar. So [facility] [phone].    ATENCIÓN: Si habla español, tiene a lopez disposición servicios gratuitos de asistencia lingüística. Llame al [phone].    We comply with applicable federal civil rights laws and [place] laws. We do not discriminate on the basis of race, color, national origin, age, disability, sex, sexual orientation, or gender identity.            Thank you!     Thank you for choosing [facility]  for your care. Our goal is always to provide you with excellent care. Hearing back from our patients is one way we can continue to improve our services. Please take a few minutes to complete the written survey that you may receive in the mail after your visit with us. Thank you!             Your Updated Medication List - Protect others around you: Learn how to safely use, store and throw away your medicines at www.disposemymeds.org.          This list is accurate as of 10/4/18 11:03 AM.  Always use your most recent med list.                   Brand Name Dispense Instructions for use Diagnosis    acyclovir 400 MG tablet    ZOVIRAX    15 tablet    Take 1 tablet (400 mg) by mouth 3 times daily    Cold sore       BENADRYL 25 MG tablet   Generic drug:  diphenhydrAMINE      Take 25 mg by mouth every 6 hours as needed for itching or allergies        cyclobenzaprine 5 MG tablet    FLEXERIL    90 tablet    Take 1 tablet by mouth at  bedtime    Persistent insomnia       FLUoxetine 20 MG capsule    PROzac    30 capsule    Take 1 capsule (20 mg) by mouth daily    Severe episode of recurrent major depressive disorder, without psychotic  features (H)       LORazepam 1 MG tablet    ATIVAN    10 tablet    Take 1 tablet (1 mg) by mouth every 6 hours as needed for anxiety And/ orTake 1 tablet prior to dental work.    Persistent insomnia       zolpidem 5 MG tablet    AMBIEN    30 tablet    Take 1 tablet (5 mg) by mouth At Bedtime    Persistent insomnia
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
## 2018-10-04 NOTE — PROGRESS NOTES
Injectable Influenza Immunization Documentation    1.  Is the person to be vaccinated sick today?   No    2. Does the person to be vaccinated have an allergy to a component   of the vaccine?   No  Egg Allergy Algorithm Link    3. Has the person to be vaccinated ever had a serious reaction   to influenza vaccine in the past?   No    4. Has the person to be vaccinated ever had Guillain-Barré syndrome?   No    Form completed by patient.      Prior to injection verified patient identity using patient's name and date of birth.    Due to injection administration, patient instructed to remain in clinic for 15 minutes  afterwards, and to report any adverse reaction to me immediately.    Lisa Magill, CMA

## 2018-10-04 NOTE — NURSING NOTE
"Chief Complaint   Patient presents with     Physical     Physical and Pap     Blood Draw     LABS.  Patient is fasting     Refill Request     flexeril goes to [facility] next door.  Ambien goes to [facility].      Flu Shot     Initial /86 (BP Location: Right arm, Patient Position: Chair, Cuff Size: Adult Regular)  Pulse 80  Temp 98.5  F (36.9  C) (Oral)  Resp 12  Ht 5' 6.25\" (1.683 m)  Wt 147 lb 4.8 oz (66.8 kg)  LMP  (LMP Unknown)  BMI 23.6 kg/m2 Estimated body mass index is 23.6 kg/(m^2) as calculated from the following:    Height as of this encounter: 5' 6.25\" (1.683 m).    Weight as of this encounter: 147 lb 4.8 oz (66.8 kg).  BP completed using cuff size regular right arm    Lisa Magill, CMA    "

## 2018-10-05 PROBLEM — E78.5 HYPERLIPIDEMIA WITH TARGET LDL LESS THAN 100: Status: ACTIVE | Noted: 2018-10-05

## 2018-10-05 LAB — DEPRECATED CALCIDIOL+CALCIFEROL SERPL-MC: 26 UG/L (ref 20–75)

## 2018-10-05 ASSESSMENT — PATIENT HEALTH QUESTIONNAIRE - PHQ9: SUM OF ALL RESPONSES TO PHQ QUESTIONS 1-9: 17

## 2018-10-08 LAB
COPATH REPORT: NORMAL
PAP: NORMAL

## 2018-10-16 LAB
FINAL DIAGNOSIS: NORMAL
HPV HR 12 DNA CVX QL NAA+PROBE: NEGATIVE
HPV16 DNA SPEC QL NAA+PROBE: NEGATIVE
HPV18 DNA SPEC QL NAA+PROBE: NEGATIVE
SPECIMEN DESCRIPTION: NORMAL
SPECIMEN SOURCE CVX/VAG CYTO: NORMAL

## 2018-10-20 ENCOUNTER — RADIANT APPOINTMENT (OUTPATIENT)
Dept: MAMMOGRAPHY | Facility: CLINIC | Age: 48
End: 2018-10-20
Attending: FAMILY MEDICINE
Payer: COMMERCIAL

## 2018-10-20 DIAGNOSIS — Z12.31 VISIT FOR SCREENING MAMMOGRAM: ICD-10-CM

## 2018-10-20 DIAGNOSIS — Z00.00 ROUTINE GENERAL MEDICAL EXAMINATION AT A HEALTH CARE FACILITY: ICD-10-CM

## 2018-10-20 PROCEDURE — 77067 SCR MAMMO BI INCL CAD: CPT | Mod: TC

## 2018-12-11 DIAGNOSIS — G47.00 PERSISTENT INSOMNIA: ICD-10-CM

## 2018-12-11 RX ORDER — CYCLOBENZAPRINE HCL 5 MG
TABLET ORAL
Qty: 90 TABLET | Refills: 3 | Status: SHIPPED | OUTPATIENT
Start: 2018-12-11 | End: 2020-01-15

## 2018-12-11 NOTE — TELEPHONE ENCOUNTER
Requested Prescriptions   Pending Prescriptions Disp Refills     cyclobenzaprine (FLEXERIL) 5 MG tablet [Pharmacy Med Name: CYCLOBENZAPRINE 5 MG TABLET] 90 tablet 3     Sig: TAKE 1 TABLET BY MOUTH AT BEDTIME    There is no refill protocol information for this order        Last Written Prescription Date:  10/4/18  Last Fill Quantity: 90,  # refills: 3   Last Office Visit: 10/4/2018   Future Office Visit:       Associated Diagnoses     Persistent insomnia [G47.00]           Routing refill request to provider for review/approval because:  Drug not on the [facility], P or [facility] refill protocol or controlled substance

## 2018-12-11 NOTE — TELEPHONE ENCOUNTER
Routing refill request to provider for review/approval because:  Drug not on the [facility] refill protocol   Amanda Salas RN

## 2019-01-18 ENCOUNTER — TELEPHONE (OUTPATIENT)
Dept: FAMILY MEDICINE | Facility: CLINIC | Age: 49
End: 2019-01-18

## 2019-01-18 NOTE — TELEPHONE ENCOUNTER
Panel Management Review      Patient has the following on her problem list:     Depression / Dysthymia review    Measure:  Needs PHQ-9 score of 4 or less during index window.  Administer PHQ-9 and if score is 5 or more, send encounter to provider for next steps.    5 - 7 month window range: PHQ-9 due NOW    PHQ-9 SCORE 11/20/2017 6/26/2018 10/4/2018   PHQ-9 Total Score - - -   PHQ-9 Total Score MyChart - - 17 (Moderately severe depression)   PHQ-9 Total Score 22 22 17       If PHQ-9 recheck is 5 or more, route to provider for next steps.    Patient is due for:  PHQ9 and SUPRIYA-7      Composite cancer screening  Chart review shows that this patient is due/due soon for the following None  Summary:    Patient is due/failing the following:   FOLLOW UP and PHQ9    Action needed:   Patient needs a follow up visit for depression(E-visit is ok).  Patient needs to do PHQ9.    Type of outreach:    Phone, spoke to patient.  Patient picked up the medication, but ended up not taking it.  Does not feel like she needs it.  Feels great, considering this time of year.      PHQ-9 and SUPRIYA-7 questions completed.    PHQ-9 SCORE 6/26/2018 10/4/2018 1/22/2019   PHQ-9 Total Score - - -   PHQ-9 Total Score MyChart - 17 (Moderately severe depression) -   PHQ-9 Total Score 22 17 11      SUPRIYA-7 SCORE 11/20/2017 6/26/2018 1/22/2019   Total Score - - -   Total Score 19 11 5       Questions for provider review:    None                                                                                                                              Lisa Magill, [facility]       Chart routed to Provider .

## 2019-01-22 ASSESSMENT — ANXIETY QUESTIONNAIRES
6. BECOMING EASILY ANNOYED OR IRRITABLE: SEVERAL DAYS
7. FEELING AFRAID AS IF SOMETHING AWFUL MIGHT HAPPEN: NOT AT ALL
2. NOT BEING ABLE TO STOP OR CONTROL WORRYING: SEVERAL DAYS
3. WORRYING TOO MUCH ABOUT DIFFERENT THINGS: NOT AT ALL
1. FEELING NERVOUS, ANXIOUS, OR ON EDGE: SEVERAL DAYS
GAD7 TOTAL SCORE: 5
5. BEING SO RESTLESS THAT IT IS HARD TO SIT STILL: SEVERAL DAYS

## 2019-01-22 ASSESSMENT — PATIENT HEALTH QUESTIONNAIRE - PHQ9
SUM OF ALL RESPONSES TO PHQ QUESTIONS 1-9: 11
5. POOR APPETITE OR OVEREATING: SEVERAL DAYS

## 2019-01-23 ASSESSMENT — ANXIETY QUESTIONNAIRES: GAD7 TOTAL SCORE: 5

## 2019-03-12 ENCOUNTER — TRANSFERRED RECORDS (OUTPATIENT)
Dept: HEALTH INFORMATION MANAGEMENT | Facility: CLINIC | Age: 49
End: 2019-03-12

## 2019-07-17 ENCOUNTER — TELEPHONE (OUTPATIENT)
Dept: FAMILY MEDICINE | Facility: CLINIC | Age: 49
End: 2019-07-17

## 2019-07-17 DIAGNOSIS — G47.00 PERSISTENT INSOMNIA: ICD-10-CM

## 2019-07-17 RX ORDER — ZOLPIDEM TARTRATE 5 MG/1
5 TABLET ORAL AT BEDTIME
Qty: 30 TABLET | Refills: 3 | Status: CANCELLED | OUTPATIENT
Start: 2019-07-17

## 2019-07-17 NOTE — TELEPHONE ENCOUNTER
[place] reviewed:  reviewed- no concerns    Last filled 3/4/19    [place]  [email or web address]

## 2019-07-17 NOTE — TELEPHONE ENCOUNTER
Reason for Call:  Medication or medication refill:    Do you use a [facility]?  Name of the pharmacy and phone number for the current request:  [facility] in [place]    Name of the medication requested: zolpidem (AMBIEN) 5 MG tablet    Other request:  Please call when called into pharmacy    Can we leave a detailed message on this number? YES    Phone number patient can be reached at: Cell number on file:    Telephone Information:   Mobile [phone]       Best Time: any    Call taken on 7/17/2019 at 12:49 PM by Acacia Messer

## 2019-07-17 NOTE — TELEPHONE ENCOUNTER
Requested Prescriptions   Pending Prescriptions Disp Refills     zolpidem (AMBIEN) 5 MG tablet 30 tablet 3     Sig: Take 1 tablet (5 mg) by mouth At Bedtime   Last Written Prescription Date:  10/4/18  Last Fill Quantity: 30,  # refills: 3   Last Office Visit: 10/4/2018 Paul[place]      Return in about 1 month (around 11/4/2018) for depression/anxiety medication recheck/evisit ok.     Future Office Visit:         There is no refill protocol information for this order        Routing refill request to provider for review/approval because:  Drug not on the [facility], P or [facility] refill protocol or controlled substance

## 2019-07-18 ENCOUNTER — VIRTUAL VISIT (OUTPATIENT)
Dept: FAMILY MEDICINE | Facility: CLINIC | Age: 49
End: 2019-07-18
Payer: COMMERCIAL

## 2019-07-18 DIAGNOSIS — B00.1 COLD SORE: ICD-10-CM

## 2019-07-18 DIAGNOSIS — G47.00 PERSISTENT INSOMNIA: Primary | ICD-10-CM

## 2019-07-18 PROCEDURE — 99441 ZZC PHYSICIAN TELEPHONE EVALUATION 5-10 MIN: CPT | Performed by: NURSE PRACTITIONER

## 2019-07-18 RX ORDER — ACYCLOVIR 400 MG/1
400 TABLET ORAL 3 TIMES DAILY
Qty: 15 TABLET | Refills: 3 | Status: SHIPPED | OUTPATIENT
Start: 2019-07-18 | End: 2020-01-15

## 2019-07-18 RX ORDER — ZOLPIDEM TARTRATE 5 MG/1
5 TABLET ORAL AT BEDTIME
Qty: 30 TABLET | Refills: 3 | Status: SHIPPED | OUTPATIENT
Start: 2019-07-18 | End: 2020-01-15

## 2019-07-18 ASSESSMENT — ANXIETY QUESTIONNAIRES
GAD7 TOTAL SCORE: 11
IF YOU CHECKED OFF ANY PROBLEMS ON THIS QUESTIONNAIRE, HOW DIFFICULT HAVE THESE PROBLEMS MADE IT FOR YOU TO DO YOUR WORK, TAKE CARE OF THINGS AT HOME, OR GET ALONG WITH OTHER PEOPLE: SOMEWHAT DIFFICULT
6. BECOMING EASILY ANNOYED OR IRRITABLE: MORE THAN HALF THE DAYS
5. BEING SO RESTLESS THAT IT IS HARD TO SIT STILL: SEVERAL DAYS
3. WORRYING TOO MUCH ABOUT DIFFERENT THINGS: MORE THAN HALF THE DAYS
7. FEELING AFRAID AS IF SOMETHING AWFUL MIGHT HAPPEN: NOT AT ALL
2. NOT BEING ABLE TO STOP OR CONTROL WORRYING: MORE THAN HALF THE DAYS
1. FEELING NERVOUS, ANXIOUS, OR ON EDGE: MORE THAN HALF THE DAYS

## 2019-07-18 ASSESSMENT — PATIENT HEALTH QUESTIONNAIRE - PHQ9
5. POOR APPETITE OR OVEREATING: MORE THAN HALF THE DAYS
SUM OF ALL RESPONSES TO PHQ QUESTIONS 1-9: 14

## 2019-07-18 NOTE — PROGRESS NOTES
"Narda Guerrero is a 49 year old female who is being evaluated via a billable telephone visit.      The patient has been notified of following:     \"This telephone visit will be conducted via a call between you and your physician/provider. We have found that certain health care needs can be provided without the need for a physical exam.  This service lets us provide the care you need with a short phone conversation.  If a prescription is necessary we can send it directly to your pharmacy.  If lab work is needed we can place an order for that and you can then stop by our lab to have the test done at a later time.    If during the course of the call the physician/provider feels a telephone visit is not appropriate, you will not be charged for this service.\"     Consent has been obtained for this service by 1 care team member: yes. See the scanned image in the medical record.    Narda Guerrero complains of    Chief Complaint   Patient presents with     Telephone     Ambien       I have reviewed and updated the patient's Past Medical History, Social History, Family History and Medication List.    ALLERGIES  Ortho tri-cyclen [ortho tri-cyclen lo] and Sulfa drugs    Yocasta FUNG   (MA signature)    Additional provider notes:  Difficulty with insomnia.  Has not taken Ambien for several months.  When she takes Ambien usually take 2/3 of a 5 mg tablet.  When she doesn't take sleeps on average 4-6 hours per night.  She will be going on a trip to [place] and has to be able to sleep. PHQ 9 score of 14, last score of 11. Denies thoughts of harming self. SUPRIYA 7 score of 11, was 5.      Assessment/Plan:  Narda was seen today for telephone.    Diagnoses and all orders for this visit:    Persistent insomnia:  Refill below, will take on an as needed basis, will need when goes on her trip to [place].   -     zolpidem (AMBIEN) 5 MG tablet; Take 1 tablet (5 mg) by mouth At Bedtime    Cold sore: refills to have on hand if " needed.  -     acyclovir (ZOVIRAX) 400 MG tablet; Take 1 tablet (400 mg) by mouth 3 times daily    I have reviewed the note as documented above.  This accurately captures the substance of my conversation with the patient.Narda Guerrero    Total time of call between patient and provider was 6 minutes   Susan Haase, CNP

## 2019-07-18 NOTE — TELEPHONE ENCOUNTER
Pt needs appt, Viri is fine, over due for PHQ-9, I have already send her a [employer] message.  Thank you

## 2019-07-19 ASSESSMENT — ANXIETY QUESTIONNAIRES: GAD7 TOTAL SCORE: 11

## 2019-08-14 ENCOUNTER — TELEPHONE (OUTPATIENT)
Dept: FAMILY MEDICINE | Facility: CLINIC | Age: 49
End: 2019-08-14

## 2019-08-14 NOTE — TELEPHONE ENCOUNTER
Panel Management Review      Patient has the following on her problem list: None      Composite cancer screening  Chart review shows that this patient is due/due soon for the following None  Summary:    Patient is due/failing the following:   SUPRIYA-7     Action needed:   Patient needs to do SUPRIYA-7    Type of outreach:    Sent [employer] message.    Questions for provider review:    None                                                                                                                                    Lisa Magill, CMA       Chart routed to Care Team .

## 2019-08-23 NOTE — TELEPHONE ENCOUNTER
Patient has read the MY CHART message regarding the health maintenance that is due.  Lisa Magill, CMA

## 2019-09-29 ENCOUNTER — HEALTH MAINTENANCE LETTER (OUTPATIENT)
Age: 49
End: 2019-09-29

## 2019-12-02 ENCOUNTER — MYC MEDICAL ADVICE (OUTPATIENT)
Dept: FAMILY MEDICINE | Facility: CLINIC | Age: 49
End: 2019-12-02

## 2019-12-02 ASSESSMENT — PATIENT HEALTH QUESTIONNAIRE - PHQ9
10. IF YOU CHECKED OFF ANY PROBLEMS, HOW DIFFICULT HAVE THESE PROBLEMS MADE IT FOR YOU TO DO YOUR WORK, TAKE CARE OF THINGS AT HOME, OR GET ALONG WITH OTHER PEOPLE: SOMEWHAT DIFFICULT
SUM OF ALL RESPONSES TO PHQ QUESTIONS 1-9: 8
SUM OF ALL RESPONSES TO PHQ QUESTIONS 1-9: 8

## 2019-12-03 ASSESSMENT — PATIENT HEALTH QUESTIONNAIRE - PHQ9: SUM OF ALL RESPONSES TO PHQ QUESTIONS 1-9: 8

## 2019-12-09 ENCOUNTER — HEALTH MAINTENANCE LETTER (OUTPATIENT)
Age: 49
End: 2019-12-09

## 2020-01-15 ENCOUNTER — OFFICE VISIT (OUTPATIENT)
Dept: FAMILY MEDICINE | Facility: CLINIC | Age: 50
End: 2020-01-15
Payer: COMMERCIAL

## 2020-01-15 VITALS
HEART RATE: 68 BPM | RESPIRATION RATE: 16 BRPM | SYSTOLIC BLOOD PRESSURE: 118 MMHG | WEIGHT: 144.8 LBS | TEMPERATURE: 98.3 F | DIASTOLIC BLOOD PRESSURE: 76 MMHG | BODY MASS INDEX: 23.27 KG/M2 | HEIGHT: 66 IN

## 2020-01-15 DIAGNOSIS — G43.109 MIGRAINE WITH AURA AND WITHOUT STATUS MIGRAINOSUS, NOT INTRACTABLE: ICD-10-CM

## 2020-01-15 DIAGNOSIS — J31.0 CHRONIC RHINITIS: ICD-10-CM

## 2020-01-15 DIAGNOSIS — G47.00 PERSISTENT INSOMNIA: ICD-10-CM

## 2020-01-15 DIAGNOSIS — F13.20 SEDATIVE, HYPNOTIC OR ANXIOLYTIC DEPENDENCE (H): Primary | ICD-10-CM

## 2020-01-15 DIAGNOSIS — R13.10 DYSPHAGIA, UNSPECIFIED TYPE: ICD-10-CM

## 2020-01-15 PROCEDURE — 99214 OFFICE O/P EST MOD 30 MIN: CPT | Performed by: FAMILY MEDICINE

## 2020-01-15 RX ORDER — ONDANSETRON 4 MG/1
4 TABLET, ORALLY DISINTEGRATING ORAL EVERY 8 HOURS PRN
Qty: 20 TABLET | Refills: 3 | Status: SHIPPED | OUTPATIENT
Start: 2020-01-15 | End: 2021-11-29

## 2020-01-15 RX ORDER — ZOLMITRIPTAN 5 MG/1
5 TABLET, ORALLY DISINTEGRATING ORAL
Qty: 9 TABLET | Refills: 3 | Status: SHIPPED | OUTPATIENT
Start: 2020-01-15 | End: 2024-08-01

## 2020-01-15 RX ORDER — ZOLPIDEM TARTRATE 5 MG/1
5 TABLET ORAL AT BEDTIME
Qty: 30 TABLET | Refills: 5 | Status: SHIPPED | OUTPATIENT
Start: 2020-01-15 | End: 2020-09-21

## 2020-01-15 ASSESSMENT — PATIENT HEALTH QUESTIONNAIRE - PHQ9
SUM OF ALL RESPONSES TO PHQ QUESTIONS 1-9: 8
5. POOR APPETITE OR OVEREATING: MORE THAN HALF THE DAYS

## 2020-01-15 ASSESSMENT — ANXIETY QUESTIONNAIRES
1. FEELING NERVOUS, ANXIOUS, OR ON EDGE: MORE THAN HALF THE DAYS
5. BEING SO RESTLESS THAT IT IS HARD TO SIT STILL: SEVERAL DAYS
2. NOT BEING ABLE TO STOP OR CONTROL WORRYING: MORE THAN HALF THE DAYS
6. BECOMING EASILY ANNOYED OR IRRITABLE: SEVERAL DAYS
7. FEELING AFRAID AS IF SOMETHING AWFUL MIGHT HAPPEN: NOT AT ALL
GAD7 TOTAL SCORE: 9
3. WORRYING TOO MUCH ABOUT DIFFERENT THINGS: SEVERAL DAYS
IF YOU CHECKED OFF ANY PROBLEMS ON THIS QUESTIONNAIRE, HOW DIFFICULT HAVE THESE PROBLEMS MADE IT FOR YOU TO DO YOUR WORK, TAKE CARE OF THINGS AT HOME, OR GET ALONG WITH OTHER PEOPLE: NOT DIFFICULT AT ALL

## 2020-01-15 ASSESSMENT — MIFFLIN-ST. JEOR: SCORE: 1312.53

## 2020-01-15 NOTE — PROGRESS NOTES
Subjective     Narda Guerrero is a 49 year old female who presents to clinic today for the following health issues:    HPI     Migraine     Since your last clinic visit, how have your headaches changed?  Worsened    How often are you getting headaches or migraines? Every couple of months for migraines.  Once a week patient gets a headache.      Are you able to do normal daily activities when you have a migraine? No    Are you taking rescue/relief medications? (Select all that apply) ibuprofen (Advil, Motrin) and sumatriptan (Imitrex)    How helpful is your rescue/relief medication?  I get only a small amount of relief    Are you taking any medications to prevent migraines? (Select all that apply)  No    In the past 4 weeks, how often have you gone to urgent care or the emergency room because of your headaches?  0      How many servings of fruits and vegetables do you eat daily?  2-3    On average, how many sweetened beverages do you drink each day (Examples: soda, juice, sweet tea, etc.  Do NOT count diet or artificially sweetened beverages)?   0    How many days per week do you exercise enough to make your heart beat faster? Not much     How many minutes a day do you exercise enough to make your heart beat faster?  Maybe 10 right now     How many days per week do you miss taking your medication? 0    Her migraines have become more frequent, occurring every three months. During the migraines, she experiences flashing lights in her vision, photophobia, nausea, and vomiting. Patient states all she can do is lay in bed. She does not remember taking the Imitrex in the past.     Insomnia  Onset: ongoing     Description:   Time to fall asleep (sleep latency): 1 hour on the medication  Middle of night awakening:  YES  Early morning awakening:  no    Progression of Symptoms:  Improved on the medication     Accompanying Signs & Symptoms:  Daytime sleepiness/napping: no  Excessive snoring/apnea: no  Restless legs:  no  Frequent urination: no  Chronic pain:  no    History:  Prior Insomnia: YES    Precipitating factors:   New stressful situation: no  Caffeine intake: no  OTC decongestants: YES- sudafed and benadryl  Any new medications: no    Alleviating factors:  Self medicating (alcohol, etc.):  no    Therapies Tried and outcome: ambien helps patient sleep     Patient here for follow-up of insomnia. She states the Ambien works great. It helps her fall asleep and wake up at the time she needs to. Patient does not drink beverages with caffeine . Of note, she is taking sudafed and benadryl due to nasal congestion. The congestion worsens in the morning and she believes it's related to allergies.     Depression       PHQ 1/15/2020: 8   SUPRIYA 1/15/2020: 9     PHQ-9 SCORE 1/22/2019 7/18/2019 12/2/2019   PHQ-9 Total Score - - -   PHQ-9 Total Score MyChart - - 8 (Mild depression)   PHQ-9 Total Score 11 14 8     Patient states her mood is well managed.     Swallowing Difficulties   Patient has had difficulties swallowing food for the past two years. At times, food gets stuck in her throat and she needs to vomit to be able to swallow additional food. The symptoms continue to worsen and she would like a referral to ENT to address this.     BP Readings from Last 3 Encounters:   01/15/20 118/76   10/04/18 122/86   11/20/17 122/88    Wt Readings from Last 3 Encounters:   01/15/20 65.7 kg (144 lb 12.8 oz)   10/04/18 66.8 kg (147 lb 4.8 oz)   11/20/17 64 kg (141 lb)        Reviewed and updated as needed this visit by Provider  Meds  Problems         Review of Systems   ROS COMP: Constitutional, HEENT, cardiovascular, pulmonary, gi and gu systems are negative, except as otherwise noted.    This document serves as a record of the services and decisions personally performed and made by Pricilla Bob MD. It was created on her behalf by Gregoria Bradley, a trained medical scribe. The creation of this document is based on the provider's statements  "to the medical scribe.  Gregoria Bradley 10:10 AM January 15, 2020      Objective    /76 (BP Location: Right arm, Patient Position: Sitting, Cuff Size: Adult Regular)   Pulse 68   Temp 98.3  F (36.8  C) (Oral)   Resp 16   Ht 1.683 m (5' 6.25\")   Wt 65.7 kg (144 lb 12.8 oz)   BMI 23.20 kg/m    Body mass index is 23.2 kg/m .  Physical Exam   GENERAL: healthy, alert and no distress  EYES: Eyes grossly normal to inspection, PERRL and conjunctivae and sclerae normal  HENT: ear canals and TM's normal, nose and mouth without ulcers or lesions  NECK: no adenopathy, no asymmetry, masses, or scars  RESP: lungs clear to auscultation - no rales, rhonchi or wheezes  CV: regular rate and rhythm, normal S1 S2, no S3 or S4, no murmur, click or rub, no peripheral edema and peripheral pulses strong  PSYCH: mentation appears normal, affect normal/bright    Diagnostic Test Results:  Labs reviewed in Epic  No results found for this or any previous visit (from the past 24 hour(s)).      Assessment & Plan     (F13.20) Sedative, hypnotic or anxiolytic dependence (H)  (primary encounter diagnosis)  Comment: Stable.   Plan: Continue Ambien.     (G47.00) Persistent insomnia  Comment: Stable. Refills provided.   Plan: zolpidem (AMBIEN) 5 MG tablet          (J31.0) Chronic rhinitis  Comment/Plan: Recommended a 24-hour antihistamine such at Loratadine or Cetirizine rather than benadryl.     (G43.109) Migraine with aura and without status migrainosus, not intractable  Comment: Patient experiencing more frequent migraines. We will try zolmitriptan for abortive therapy. In addition, we will try Zofran for nausea.   Plan: ZOLMitriptan (ZOMIG-ZMT) 5 MG ODT, ondansetron         (ZOFRAN-ODT) 4 MG ODT tab          (R13.10) Dysphagia, unspecified type  Comment: Upper endoscopy ordered. Consider referral to GI in future for further work-up.   Plan: GASTROENTEROLOGY ADULT REF PROCEDURE ONLY          No follow-ups on file.    The information in " this document, created by the medical scribe for me, accurately reflects the services I personally performed and the decisions made by me. I have reviewed and approved this document for accuracy prior to leaving the patient care area.  January 15, 2020 10:26 AM    Pricilla Bob MD  [facility]

## 2020-01-15 NOTE — NURSING NOTE
"Chief Complaint   Patient presents with     Migraine Mgmt     Insomnia     Initial /76 (BP Location: Right arm, Patient Position: Sitting, Cuff Size: Adult Regular)   Pulse 68   Temp 98.3  F (36.8  C) (Oral)   Resp 16   Ht 1.683 m (5' 6.25\")   Wt 65.7 kg (144 lb 12.8 oz)   BMI 23.20 kg/m   Estimated body mass index is 23.2 kg/m  as calculated from the following:    Height as of this encounter: 1.683 m (5' 6.25\").    Weight as of this encounter: 65.7 kg (144 lb 12.8 oz).  BP completed using cuff size regular right arm    Lisa Magill, CMA    "

## 2020-01-16 ASSESSMENT — ANXIETY QUESTIONNAIRES: GAD7 TOTAL SCORE: 9

## 2020-01-24 ENCOUNTER — HOSPITAL ENCOUNTER (OUTPATIENT)
Facility: CLINIC | Age: 50
End: 2020-01-24
Attending: INTERNAL MEDICINE | Admitting: INTERNAL MEDICINE
Payer: COMMERCIAL

## 2020-02-04 RX ORDER — ONDANSETRON 2 MG/ML
4 INJECTION INTRAMUSCULAR; INTRAVENOUS
Status: CANCELLED | OUTPATIENT
Start: 2020-02-04

## 2020-02-04 RX ORDER — LIDOCAINE 40 MG/G
CREAM TOPICAL
Status: CANCELLED | OUTPATIENT
Start: 2020-02-04

## 2020-03-11 DIAGNOSIS — G47.00 PERSISTENT INSOMNIA: ICD-10-CM

## 2020-03-12 NOTE — TELEPHONE ENCOUNTER
I'm not seeing any documentation on this and why she has been prescribed this as well as ambien for insomnia.  Will defer for Dr. Bob's return.    Balaji Lal MD

## 2020-03-12 NOTE — TELEPHONE ENCOUNTER
Flexeril      Last Written Prescription Date:  12/12/19  Last Fill Quantity: 90,   # refills: 0  Last Office Visit: 1/15/2020  Future Office visit:       Routing refill request to provider for review/approval because:  Drug not on the [facility], [facility] or [facility] refill protocol or controlled substance  Lucía Hector RN, BSN

## 2020-03-16 RX ORDER — CYCLOBENZAPRINE HCL 5 MG
TABLET ORAL
Qty: 90 TABLET | Refills: 0 | Status: SHIPPED | OUTPATIENT
Start: 2020-03-16 | End: 2020-06-08

## 2020-07-09 DIAGNOSIS — G47.00 PERSISTENT INSOMNIA: ICD-10-CM

## 2020-07-10 NOTE — TELEPHONE ENCOUNTER
Routing refill request to provider for review/approval because:  Drug not on the [facility] refill protocol     Soledad Neri RN on 7/10/2020 at 8:30 AM

## 2020-07-15 RX ORDER — CYCLOBENZAPRINE HCL 5 MG
TABLET ORAL
Qty: 30 TABLET | Refills: 0 | Status: SHIPPED | OUTPATIENT
Start: 2020-07-15 | End: 2020-08-26

## 2020-08-26 ENCOUNTER — TELEPHONE (OUTPATIENT)
Dept: FAMILY MEDICINE | Facility: CLINIC | Age: 50
End: 2020-08-26

## 2020-08-26 DIAGNOSIS — G47.00 PERSISTENT INSOMNIA: ICD-10-CM

## 2020-08-26 RX ORDER — ZOLPIDEM TARTRATE 5 MG/1
5 TABLET ORAL AT BEDTIME
Qty: 90 TABLET | Refills: 1 | OUTPATIENT
Start: 2020-08-26

## 2020-08-26 NOTE — TELEPHONE ENCOUNTER
Patient called in frustrated that she was only given a 30 day supply of Flexeril last month with no refills and that she has no refills remaining of Zolpidem.     Patient stated she used to get a 3 month to year supply of these medications.     She states she can not continue to come in due to financial hardship of visits and having little insurance. Patient states she will not be seen every 6 months because these visits are too expensive.     Please review and advise on plan of care for refills on these medications.     Patient is currently out of medication. Over due for preventive annual visit. Last visit 1/15/2020.     Lauren Donnelly RN Flex

## 2020-08-26 NOTE — TELEPHONE ENCOUNTER
Spoke with patient.  Advised her to schedule a wellness exam as preventative visits are covered and she stated that under her insurance she has to pay up front and until her deductible is met-even preventative exams.  She stated that she is also very concerned about coming in for an exam in regards to COVID.  She spends a lot of time with her parents who are immunocompromised and does not go out a lot.    Phone number for the [employer] given to patient to call and find out home much the patient would have to pay for an office visit.    Patient would like to know how much Flexeril you will fill for the patient?  She has been out for 1 week and is really feeling the difference.  Would appreciate 6 months worth.    Also, patient stated that since she has been out of her Ambien x 1 week, she has been unable to sleep which has increased her symptoms of depression.    Amanda Salas RN

## 2020-08-26 NOTE — TELEPHONE ENCOUNTER
Will refill 90 with refill for flexeril.  I also placed care coordination referral to discuss coverage and how to afford to be seen

## 2020-08-26 NOTE — TELEPHONE ENCOUNTER
I can give refill on flexeril, however, the ambien is a controlled substance. She is over due for her wellness visit, preventive visits are covered by law, so not sure why we cannot combine this type of visit with her need for refills. If she can buy medications over the counter that would work, she would not have to come in for med checks. This is ultimately up to her.

## 2020-08-27 RX ORDER — CYCLOBENZAPRINE HCL 5 MG
TABLET ORAL
Qty: 90 TABLET | Refills: 1 | Status: SHIPPED | OUTPATIENT
Start: 2020-08-27 | End: 2020-11-12

## 2020-09-21 ENCOUNTER — NURSE TRIAGE (OUTPATIENT)
Dept: NURSING | Facility: CLINIC | Age: 50
End: 2020-09-21

## 2020-09-21 DIAGNOSIS — G47.00 PERSISTENT INSOMNIA: ICD-10-CM

## 2020-09-22 RX ORDER — ZOLPIDEM TARTRATE 5 MG/1
5 TABLET ORAL AT BEDTIME
Qty: 30 TABLET | Refills: 0 | Status: SHIPPED | OUTPATIENT
Start: 2020-09-22 | End: 2020-11-12

## 2020-11-12 ENCOUNTER — OFFICE VISIT (OUTPATIENT)
Dept: FAMILY MEDICINE | Facility: CLINIC | Age: 50
End: 2020-11-12
Payer: COMMERCIAL

## 2020-11-12 VITALS
TEMPERATURE: 98.5 F | BODY MASS INDEX: 23.9 KG/M2 | WEIGHT: 149.2 LBS | SYSTOLIC BLOOD PRESSURE: 132 MMHG | DIASTOLIC BLOOD PRESSURE: 80 MMHG | HEART RATE: 88 BPM | RESPIRATION RATE: 16 BRPM

## 2020-11-12 DIAGNOSIS — G47.00 PERSISTENT INSOMNIA: ICD-10-CM

## 2020-11-12 DIAGNOSIS — F33.2 SEVERE EPISODE OF RECURRENT MAJOR DEPRESSIVE DISORDER, WITHOUT PSYCHOTIC FEATURES (H): ICD-10-CM

## 2020-11-12 DIAGNOSIS — Z00.00 ROUTINE GENERAL MEDICAL EXAMINATION AT A HEALTH CARE FACILITY: Primary | ICD-10-CM

## 2020-11-12 DIAGNOSIS — R13.10 DYSPHAGIA, UNSPECIFIED TYPE: ICD-10-CM

## 2020-11-12 DIAGNOSIS — Z11.59 NEED FOR HEPATITIS C SCREENING TEST: ICD-10-CM

## 2020-11-12 DIAGNOSIS — Z11.4 SCREENING FOR HIV (HUMAN IMMUNODEFICIENCY VIRUS): ICD-10-CM

## 2020-11-12 PROCEDURE — 99396 PREV VISIT EST AGE 40-64: CPT | Performed by: FAMILY MEDICINE

## 2020-11-12 RX ORDER — ZOLPIDEM TARTRATE 5 MG/1
5 TABLET ORAL AT BEDTIME
Qty: 30 TABLET | Refills: 5 | Status: SHIPPED | OUTPATIENT
Start: 2020-11-12 | End: 2021-05-04

## 2020-11-12 RX ORDER — CYCLOBENZAPRINE HCL 5 MG
TABLET ORAL
Qty: 90 TABLET | Refills: 3 | Status: SHIPPED | OUTPATIENT
Start: 2020-11-12 | End: 2021-12-03

## 2020-11-12 SDOH — ECONOMIC STABILITY: TRANSPORTATION INSECURITY
IN THE PAST 12 MONTHS, HAS THE LACK OF TRANSPORTATION KEPT YOU FROM MEDICAL APPOINTMENTS OR FROM GETTING MEDICATIONS?: NO

## 2020-11-12 SDOH — SOCIAL STABILITY: SOCIAL NETWORK
IN A TYPICAL WEEK, HOW MANY TIMES DO YOU TALK ON THE PHONE WITH FAMILY, FRIENDS, OR NEIGHBORS?: MORE THAN THREE TIMES A WEEK

## 2020-11-12 SDOH — HEALTH STABILITY: MENTAL HEALTH: HOW MANY STANDARD DRINKS CONTAINING ALCOHOL DO YOU HAVE ON A TYPICAL DAY?: 1 OR 2

## 2020-11-12 SDOH — SOCIAL STABILITY: SOCIAL NETWORK: HOW OFTEN DO YOU GET TOGETHER WITH FRIENDS OR RELATIVES?: MORE THAN THREE TIMES A WEEK

## 2020-11-12 SDOH — ECONOMIC STABILITY: TRANSPORTATION INSECURITY
IN THE PAST 12 MONTHS, HAS LACK OF TRANSPORTATION KEPT YOU FROM MEETINGS, WORK, OR FROM GETTING THINGS NEEDED FOR DAILY LIVING?: NO

## 2020-11-12 SDOH — HEALTH STABILITY: PHYSICAL HEALTH: ON AVERAGE, HOW MANY MINUTES DO YOU ENGAGE IN EXERCISE AT THIS LEVEL?: 50 MIN

## 2020-11-12 SDOH — ECONOMIC STABILITY: INCOME INSECURITY: HOW HARD IS IT FOR YOU TO PAY FOR THE VERY BASICS LIKE FOOD, HOUSING, MEDICAL CARE, AND HEATING?: NOT VERY HARD

## 2020-11-12 SDOH — SOCIAL STABILITY: SOCIAL NETWORK: ARE YOU MARRIED, WIDOWED, DIVORCED, SEPARATED, NEVER MARRIED, OR LIVING WITH A PARTNER?: MARRIED

## 2020-11-12 SDOH — ECONOMIC STABILITY: FOOD INSECURITY: WITHIN THE PAST 12 MONTHS, YOU WORRIED THAT YOUR FOOD WOULD RUN OUT BEFORE YOU GOT MONEY TO BUY MORE.: NEVER TRUE

## 2020-11-12 SDOH — HEALTH STABILITY: MENTAL HEALTH: HOW OFTEN DO YOU HAVE A DRINK CONTAINING ALCOHOL?: 2-4 TIMES A MONTH

## 2020-11-12 SDOH — SOCIAL STABILITY: SOCIAL NETWORK: HOW OFTEN DO YOU ATTENT MEETINGS OF THE CLUB OR ORGANIZATION YOU BELONG TO?: PATIENT DECLINED

## 2020-11-12 SDOH — SOCIAL STABILITY: SOCIAL NETWORK
DO YOU BELONG TO ANY CLUBS OR ORGANIZATIONS SUCH AS CHURCH GROUPS UNIONS, FRATERNAL OR ATHLETIC GROUPS, OR SCHOOL GROUPS?: PATIENT DECLINED

## 2020-11-12 SDOH — HEALTH STABILITY: PHYSICAL HEALTH: ON AVERAGE, HOW MANY DAYS PER WEEK DO YOU ENGAGE IN MODERATE TO STRENUOUS EXERCISE (LIKE A BRISK WALK)?: 5 DAYS

## 2020-11-12 SDOH — ECONOMIC STABILITY: INCOME INSECURITY: IN THE LAST 12 MONTHS, WAS THERE A TIME WHEN YOU WERE NOT ABLE TO PAY THE MORTGAGE OR RENT ON TIME?: NO

## 2020-11-12 SDOH — SOCIAL STABILITY: SOCIAL NETWORK: HOW OFTEN DO YOU ATTEND CHURCH OR RELIGIOUS SERVICES?: MORE THAN 4 TIMES PER YEAR

## 2020-11-12 SDOH — HEALTH STABILITY: MENTAL HEALTH
STRESS IS WHEN SOMEONE FEELS TENSE, NERVOUS, ANXIOUS, OR CAN'T SLEEP AT NIGHT BECAUSE THEIR MIND IS TROUBLED. HOW STRESSED ARE YOU?: VERY MUCH

## 2020-11-12 SDOH — HEALTH STABILITY: MENTAL HEALTH: HOW OFTEN DO YOU HAVE 6 OR MORE DRINKS ON ONE OCCASION?: NEVER

## 2020-11-12 SDOH — ECONOMIC STABILITY: FOOD INSECURITY: WITHIN THE PAST 12 MONTHS, THE FOOD YOU BOUGHT JUST DIDN'T LAST AND YOU DIDN'T HAVE MONEY TO GET MORE.: NEVER TRUE

## 2020-11-12 ASSESSMENT — ENCOUNTER SYMPTOMS
CHILLS: 0
NERVOUS/ANXIOUS: 1
DYSURIA: 0
MYALGIAS: 1
PARESTHESIAS: 0
FEVER: 0
EYE PAIN: 0
FREQUENCY: 0
HEMATURIA: 0
ARTHRALGIAS: 0
HEMATOCHEZIA: 0
DIZZINESS: 0
HEARTBURN: 1
PALPITATIONS: 0
COUGH: 0
WEAKNESS: 0
JOINT SWELLING: 0
CONSTIPATION: 0
HEADACHES: 1
BREAST MASS: 0
SHORTNESS OF BREATH: 0
ABDOMINAL PAIN: 0
SORE THROAT: 0
DIARRHEA: 0
NAUSEA: 0

## 2020-11-12 ASSESSMENT — ANXIETY QUESTIONNAIRES
7. FEELING AFRAID AS IF SOMETHING AWFUL MIGHT HAPPEN: NEARLY EVERY DAY
7. FEELING AFRAID AS IF SOMETHING AWFUL MIGHT HAPPEN: NEARLY EVERY DAY
6. BECOMING EASILY ANNOYED OR IRRITABLE: NEARLY EVERY DAY
GAD7 TOTAL SCORE: 20
1. FEELING NERVOUS, ANXIOUS, OR ON EDGE: NEARLY EVERY DAY
GAD7 TOTAL SCORE: 20
4. TROUBLE RELAXING: NEARLY EVERY DAY
5. BEING SO RESTLESS THAT IT IS HARD TO SIT STILL: MORE THAN HALF THE DAYS
2. NOT BEING ABLE TO STOP OR CONTROL WORRYING: NEARLY EVERY DAY
3. WORRYING TOO MUCH ABOUT DIFFERENT THINGS: NEARLY EVERY DAY
GAD7 TOTAL SCORE: 20

## 2020-11-12 ASSESSMENT — PATIENT HEALTH QUESTIONNAIRE - PHQ9
SUM OF ALL RESPONSES TO PHQ QUESTIONS 1-9: 22
SUM OF ALL RESPONSES TO PHQ QUESTIONS 1-9: 22
10. IF YOU CHECKED OFF ANY PROBLEMS, HOW DIFFICULT HAVE THESE PROBLEMS MADE IT FOR YOU TO DO YOUR WORK, TAKE CARE OF THINGS AT HOME, OR GET ALONG WITH OTHER PEOPLE: VERY DIFFICULT

## 2020-11-12 NOTE — PROGRESS NOTES
SUBJECTIVE:   CC: Narda Guerrero is an 50 year old woman who presents for preventive health visit.       Patient has been advised of split billing requirements and indicates understanding: Yes  Healthy Habits:     Getting at least 3 servings of Calcium per day:  Yes    Bi-annual eye exam:  NO    Dental care twice a year:  Yes    Sleep apnea or symptoms of sleep apnea:  None    Diet:  Regular (no restrictions)    Frequency of exercise:  2-3 days/week    Duration of exercise:  30-45 minutes    Taking medications regularly:  Yes    Medication side effects:  Not applicable    PHQ-2 Total Score: 6    Additional concerns today:  No        Flexeril at night due to join pain and muscle tightness  Depression Followup    How are you doing with your depression since your last visit? Worsened due for covid and politics    Are you having other symptoms that might be associated with depression? Yes:  chronic insomnia, using ambien for years, works well but cannot sleep without it    Have you had a significant life event?  No     Are you feeling anxious or having panic attacks?   Yes:  see GAD7    Do you have any concerns with your use of alcohol or other drugs? No    Social History     Tobacco Use     Smoking status: Former Smoker     Packs/day: 0.00     Years: 10.00     Pack years: 0.00     Types: Cigarettes     Quit date: 1/10/2000     Years since quittin.8     Smokeless tobacco: Never Used   Substance Use Topics     Alcohol use: Yes     Frequency: 2-4 times a month     Drinks per session: 1 or 2     Binge frequency: Never     Comment: occasionally     Drug use: No     PHQ 2019 1/15/2020 2020   PHQ-9 Total Score 8 8 22   Q9: Thoughts of better off dead/self-harm past 2 weeks Not at all Not at all Not at all     SUPRIYA-7 SCORE 2019 1/15/2020 2020   Total Score - - -   Total Score - - 20 (severe anxiety)   Total Score 11 9 20     Last PHQ-9 2020   1.  Little interest or pleasure in doing  things 3   2.  Feeling down, depressed, or hopeless 2   3.  Trouble falling or staying asleep, or sleeping too much 3   4.  Feeling tired or having little energy 3   5.  Poor appetite or overeating 3   6.  Feeling bad about yourself 3   7.  Trouble concentrating 3   8.  Moving slowly or restless 2   Q9: Thoughts of better off dead/self-harm past 2 weeks 0   PHQ-9 Total Score 22   Difficulty at work, home, or with people -     SUPRIYA-7  2020   1. Feeling nervous, anxious, or on edge 3   2. Not being able to stop or control worrying 3   3. Worrying too much about different things 3   4. Trouble relaxing 3   5. Being so restless that it is hard to sit still 2   6. Becoming easily annoyed or irritable 3   7. Feeling afraid, as if something awful might happen 3   SUPRIYA-7 Total Score 20   If you checked any problems, how difficult have they made it for you to do your work, take care of things at home, or get along with other people? -         Suicide Assessment Five-step Evaluation and Treatment (SAFE-T)      Today's PHQ-2 Score:   PHQ-2 (  [employer]) 2020   Q1: Little interest or pleasure in doing things 3   Q2: Feeling down, depressed or hopeless 3   PHQ-2 Score 6   Q1: Little interest or pleasure in doing things Nearly every day   Q2: Feeling down, depressed or hopeless Nearly every day   PHQ-2 Score 6       Abuse: Current or Past (Physical, Sexual or Emotional) - NO  Do you feel safe in your environment? YES        Social History     Tobacco Use     Smoking status: Former Smoker     Packs/day: 0.00     Years: 10.00     Pack years: 0.00     Types: Cigarettes     Quit date: 1/10/2000     Years since quittin.8     Smokeless tobacco: Never Used   Substance Use Topics     Alcohol use: Yes     Frequency: 2-4 times a month     Drinks per session: 1 or 2     Binge frequency: Never     Comment: occasionally     If you drink alcohol do you typically have >3 drinks per day or >7 drinks per week? No    Alcohol Use  11/12/2020   Prescreen: >3 drinks/day or >7 drinks/week? No   Prescreen: >3 drinks/day or >7 drinks/week? -   No flowsheet data found.    Reviewed orders with patient.  Reviewed health maintenance and updated orders accordingly - Yes  Labs reviewed in EPIC  BP Readings from Last 3 Encounters:   11/12/20 132/80   01/15/20 118/76   10/04/18 122/86    Wt Readings from Last 3 Encounters:   11/12/20 67.7 kg (149 lb 3.2 oz)   01/15/20 65.7 kg (144 lb 12.8 oz)   10/04/18 66.8 kg (147 lb 4.8 oz)                  Recent Labs   Lab Test 10/04/18  1107 09/06/16  0845 07/28/15  1015   * 139*  --    HDL 53 53  --    TRIG 240* 119  --    ALT 50 27 18   CR 0.75 0.71 0.66   GFRESTIMATED 83 89 >90  Non  GFR Calc     GFRESTBLACK >90 >90   GFR Calc   >90   GFR Calc     POTASSIUM 4.5 4.4 4.3   TSH 1.30 1.28 1.30        Mammogram Screening: Patient under age 50, mutual decision reflected in health maintenance.      Pertinent mammograms are reviewed under the imaging tab.  History of abnormal Pap smear: NO - age 30-65 PAP every 5 years with negative HPV co-testing recommended  PAP / HPV Latest Ref Rng & Units 10/4/2018 7/28/2015 2/21/2012   PAP - NIL NIL NIL   HPV 16 DNA NEG:Negative Negative - -   HPV 18 DNA NEG:Negative Negative - -   OTHER HR HPV NEG:Negative Negative - -     Reviewed and updated as needed this visit by clinical staff  Tobacco  Allergies  Meds  Problems  Med Hx  Surg Hx  Fam Hx  Soc Hx          Reviewed and updated as needed this visit by Provider     Problems                Review of Systems   Constitutional: Negative for chills and fever.   HENT: Negative for congestion, ear pain, hearing loss and sore throat.    Eyes: Negative for pain and visual disturbance.   Respiratory: Negative for cough and shortness of breath.    Cardiovascular: Negative for chest pain, palpitations and peripheral edema.   Gastrointestinal: Positive for heartburn. Negative for  abdominal pain, constipation, diarrhea, hematochezia and nausea.   Breasts:  Negative for tenderness, breast mass and discharge.   Genitourinary: Negative for dysuria, frequency, genital sores, hematuria, pelvic pain, urgency, vaginal bleeding and vaginal discharge.   Musculoskeletal: Positive for myalgias. Negative for arthralgias and joint swelling.   Skin: Negative for rash.   Neurological: Positive for headaches. Negative for dizziness, weakness and paresthesias.   Psychiatric/Behavioral: Positive for mood changes. The patient is nervous/anxious.           OBJECTIVE:   /80 (BP Location: Right arm, Patient Position: Sitting, Cuff Size: Adult Regular)   Pulse 88   Temp 98.5  F (36.9  C) (Oral)   Resp 16   Wt 67.7 kg (149 lb 3.2 oz)   BMI 23.90 kg/m    Physical Exam  GENERAL: healthy, alert and no distress  EYES: Eyes grossly normal to inspection, and conjunctivae and sclerae normal  HENT: ear canals and TM's normal, nose and mouth without ulcers or lesions  NECK: no adenopathy, no asymmetry, masses, or scars and thyroid normal to palpation  RESP: lungs clear to auscultation - no rales, rhonchi or wheezes  BREAST: normal without masses, tenderness or nipple discharge and no palpable axillary masses or adenopathy  BREAST: implant noted bilt  CV: regular rate and rhythm, normal S1 S2, no S3 or S4, no murmur, click or rub, no peripheral edema and peripheral pulses strong  ABDOMEN: soft, nontender, no hepatosplenomegaly, no masses and bowel sounds normal  MS: no gross musculoskeletal defects noted, no edema  SKIN: no suspicious lesions or rashes  NEURO: Normal strength and tone, mentation intact and speech normal  PSYCH: mentation appears normal, affect normal/bright    Diagnostic Test Results:  Labs reviewed in Epic    ASSESSMENT/PLAN:   1. Routine general medical examination at a health care facility  Normal exam, due for labs  - Lipid panel reflex to direct LDL Fasting; Future  - CBC with platelets;  "Future  - Comprehensive metabolic panel; Future    2. Screening for HIV (human immunodeficiency virus)  agrees  - HIV Antigen Antibody Combo; Future    3. Need for hepatitis C screening test  agrees  - Hepatitis C Screen Reflex to HCV RNA Quant and Genotype; Future    4. Persistent insomnia  Refilled, doing well  - cyclobenzaprine (FLEXERIL) 5 MG tablet; TAKE ONE TABLET BY MOUTH AT BEDTIME  Dispense: 90 tablet; Refill: 3  - zolpidem (AMBIEN) 5 MG tablet; Take 1 tablet (5 mg) by mouth At Bedtime  Dispense: 30 tablet; Refill: 5    5. Dysphagia, unspecified type  Pt needs referral renewed, needs this at [facility], not hospital  - GASTROENTEROLOGY ADULT REF PROCEDURE ONLY; Future    6. Severe episode of recurrent major depressive disorder, without psychotic features (H)  Pt declined medications for mood, feels it is situational      Patient has been advised of split billing requirements and indicates understanding: Yes  COUNSELING:  Reviewed preventive health counseling, as reflected in patient instructions       Regular exercise       Healthy diet/nutrition    Estimated body mass index is 23.9 kg/m  as calculated from the following:    Height as of 1/15/20: 1.683 m (5' 6.25\").    Weight as of this encounter: 67.7 kg (149 lb 3.2 oz).        She reports that she quit smoking about 20 years ago. Her smoking use included cigarettes. She smoked 0.00 packs per day for 10.00 years. She has never used smokeless tobacco.      Counseling Resources:  ATP IV Guidelines  Pooled Cohorts Equation Calculator  Breast Cancer Risk Calculator  BRCA-Related Cancer Risk Assessment: FHS-7 Tool  FRAX Risk Assessment  ICSI Preventive Guidelines  Dietary Guidelines for Americans, 2010  USDA's MyPlate  ASA Prophylaxis  Lung CA Screening    Pricilla Bob MD  [facility]"

## 2020-11-12 NOTE — NURSING NOTE
"Chief Complaint   Patient presents with     Physical     Initial /80 (BP Location: Right arm, Patient Position: Sitting, Cuff Size: Adult Regular)   Pulse 88   Temp 98.5  F (36.9  C) (Oral)   Resp 16   Wt 67.7 kg (149 lb 3.2 oz)   BMI 23.90 kg/m   Estimated body mass index is 23.9 kg/m  as calculated from the following:    Height as of 1/15/20: 1.683 m (5' 6.25\").    Weight as of this encounter: 67.7 kg (149 lb 3.2 oz).  BP completed using cuff size regular right arm    Lisa Magill, CMA    "

## 2020-11-13 ASSESSMENT — ANXIETY QUESTIONNAIRES: GAD7 TOTAL SCORE: 20

## 2020-11-16 DIAGNOSIS — Z11.59 NEED FOR HEPATITIS C SCREENING TEST: ICD-10-CM

## 2020-11-16 DIAGNOSIS — Z11.4 SCREENING FOR HIV (HUMAN IMMUNODEFICIENCY VIRUS): ICD-10-CM

## 2020-11-16 DIAGNOSIS — Z00.00 ROUTINE GENERAL MEDICAL EXAMINATION AT A HEALTH CARE FACILITY: ICD-10-CM

## 2020-11-16 LAB
ALBUMIN SERPL-MCNC: 3.8 G/DL (ref 3.4–5)
ALP SERPL-CCNC: 109 U/L (ref 40–150)
ALT SERPL W P-5'-P-CCNC: 83 U/L (ref 0–50)
ANION GAP SERPL CALCULATED.3IONS-SCNC: 4 MMOL/L (ref 3–14)
AST SERPL W P-5'-P-CCNC: 37 U/L (ref 0–45)
BILIRUB SERPL-MCNC: 0.3 MG/DL (ref 0.2–1.3)
BUN SERPL-MCNC: 13 MG/DL (ref 7–30)
CALCIUM SERPL-MCNC: 9.6 MG/DL (ref 8.5–10.1)
CHLORIDE SERPL-SCNC: 105 MMOL/L (ref 94–109)
CHOLEST SERPL-MCNC: 286 MG/DL
CO2 SERPL-SCNC: 28 MMOL/L (ref 20–32)
CREAT SERPL-MCNC: 0.8 MG/DL (ref 0.52–1.04)
ERYTHROCYTE [DISTWIDTH] IN BLOOD BY AUTOMATED COUNT: 12 % (ref 10–15)
GFR SERPL CREATININE-BSD FRML MDRD: 87 ML/MIN/{1.73_M2}
GLUCOSE SERPL-MCNC: 108 MG/DL (ref 70–99)
HCT VFR BLD AUTO: 43.6 % (ref 35–47)
HCV AB SERPL QL IA: NONREACTIVE
HDLC SERPL-MCNC: 40 MG/DL
HGB BLD-MCNC: 14.2 G/DL (ref 11.7–15.7)
HIV 1+2 AB+HIV1 P24 AG SERPL QL IA: NONREACTIVE
LDLC SERPL CALC-MCNC: ABNORMAL MG/DL
LDLC SERPL DIRECT ASSAY-MCNC: 152 MG/DL
MCH RBC QN AUTO: 31.1 PG (ref 26.5–33)
MCHC RBC AUTO-ENTMCNC: 32.6 G/DL (ref 31.5–36.5)
MCV RBC AUTO: 95 FL (ref 78–100)
NONHDLC SERPL-MCNC: 246 MG/DL
PLATELET # BLD AUTO: 276 10E9/L (ref 150–450)
POTASSIUM SERPL-SCNC: 4.1 MMOL/L (ref 3.4–5.3)
PROT SERPL-MCNC: 7.4 G/DL (ref 6.8–8.8)
RBC # BLD AUTO: 4.57 10E12/L (ref 3.8–5.2)
SODIUM SERPL-SCNC: 137 MMOL/L (ref 133–144)
TRIGL SERPL-MCNC: 508 MG/DL
WBC # BLD AUTO: 6.7 10E9/L (ref 4–11)

## 2020-11-16 PROCEDURE — 80053 COMPREHEN METABOLIC PANEL: CPT | Performed by: FAMILY MEDICINE

## 2020-11-16 PROCEDURE — 87389 HIV-1 AG W/HIV-1&-2 AB AG IA: CPT | Performed by: FAMILY MEDICINE

## 2020-11-16 PROCEDURE — 83721 ASSAY OF BLOOD LIPOPROTEIN: CPT | Mod: 59 | Performed by: FAMILY MEDICINE

## 2020-11-16 PROCEDURE — 80061 LIPID PANEL: CPT | Performed by: FAMILY MEDICINE

## 2020-11-16 PROCEDURE — 86803 HEPATITIS C AB TEST: CPT | Performed by: FAMILY MEDICINE

## 2020-11-16 PROCEDURE — 85027 COMPLETE CBC AUTOMATED: CPT | Performed by: FAMILY MEDICINE

## 2021-03-14 ENCOUNTER — HEALTH MAINTENANCE LETTER (OUTPATIENT)
Age: 51
End: 2021-03-14

## 2021-05-03 ENCOUNTER — MYC MEDICAL ADVICE (OUTPATIENT)
Dept: FAMILY MEDICINE | Facility: CLINIC | Age: 51
End: 2021-05-03

## 2021-05-03 ENCOUNTER — E-VISIT (OUTPATIENT)
Dept: FAMILY MEDICINE | Facility: CLINIC | Age: 51
End: 2021-05-03
Payer: COMMERCIAL

## 2021-05-03 DIAGNOSIS — G47.00 PERSISTENT INSOMNIA: ICD-10-CM

## 2021-05-03 PROCEDURE — 99422 OL DIG E/M SVC 11-20 MIN: CPT

## 2021-05-03 ASSESSMENT — ANXIETY QUESTIONNAIRES
GAD7 TOTAL SCORE: 10
7. FEELING AFRAID AS IF SOMETHING AWFUL MIGHT HAPPEN: SEVERAL DAYS
4. TROUBLE RELAXING: MORE THAN HALF THE DAYS
1. FEELING NERVOUS, ANXIOUS, OR ON EDGE: SEVERAL DAYS
2. NOT BEING ABLE TO STOP OR CONTROL WORRYING: SEVERAL DAYS
5. BEING SO RESTLESS THAT IT IS HARD TO SIT STILL: SEVERAL DAYS
3. WORRYING TOO MUCH ABOUT DIFFERENT THINGS: MORE THAN HALF THE DAYS
6. BECOMING EASILY ANNOYED OR IRRITABLE: MORE THAN HALF THE DAYS
GAD7 TOTAL SCORE: 10
7. FEELING AFRAID AS IF SOMETHING AWFUL MIGHT HAPPEN: SEVERAL DAYS
GAD7 TOTAL SCORE: 10

## 2021-05-03 ASSESSMENT — PATIENT HEALTH QUESTIONNAIRE - PHQ9
SUM OF ALL RESPONSES TO PHQ QUESTIONS 1-9: 6
SUM OF ALL RESPONSES TO PHQ QUESTIONS 1-9: 6
10. IF YOU CHECKED OFF ANY PROBLEMS, HOW DIFFICULT HAVE THESE PROBLEMS MADE IT FOR YOU TO DO YOUR WORK, TAKE CARE OF THINGS AT HOME, OR GET ALONG WITH OTHER PEOPLE: NOT DIFFICULT AT ALL

## 2021-05-04 RX ORDER — ZOLPIDEM TARTRATE 5 MG/1
5 TABLET ORAL AT BEDTIME
Qty: 30 TABLET | Refills: 5 | Status: SHIPPED | OUTPATIENT
Start: 2021-05-04 | End: 2021-10-27

## 2021-05-04 ASSESSMENT — ANXIETY QUESTIONNAIRES: GAD7 TOTAL SCORE: 10

## 2021-07-04 NOTE — TELEPHONE ENCOUNTER
Called pt, picking up rx now, did not need printed prescription, needed renewal, pt verbalizes frustration with cost of visit, has appointment scheduled  Jaclyn Dennison RN, BSN  Message handled by CLINIC NURSE.    
Clinic Action Needed: Yes, request prescription, please call [phone]  [facility] Triage Call  Presenting Problem:    Narda requests for a new prescription for Ambien, states that she is unable to function without in paper.    She has been out of Ambien for 3 weeks.    Preferred pharmacy: [id number] - [facility] [id number]     Routed to:  TRIAGE POOL [[id number]]    Please be sure to close this encounter once this patient's issue/question has been addressed.    Yaritza Shah RN/[facility] Nurse Advisor      Additional Information    Caller has urgent medication question about med that PCP prescribed and triager unable to answer question    Protocols used: MEDICATION QUESTION CALL-A-[place]      
NA, will try later  Jaclyn Dennison RN, BSN  Message handled by CLINIC NURSE.    
Routing refill request to provider for review/approval because:  Drug not on the [facility] refill protocol   Pt due for 6 month visit, please advise, short term rx? Route to [facility] for processing, PT WANTS PRINTED RX, SET DEFAULT TO LOCAL PRINT  Jaclyn Dennison RN, BSN  Message handled by CLINIC NURSE.          
We are not supposed to print controlled meds, need to send by [facility], this is more secure, due to it being a controlled substance. I have refilled it for 30 days and sent to [facility] in [place]. Let me know if they cannot fill it (that would be a first), not sure why she is requesting a hard copy?  
independent

## 2021-07-18 NOTE — PROGRESS NOTES
"HPI   SUBJECTIVE:   Narda Guerrero is a 47 year old female who presents to clinic today for the following health issues:    Abnormal Mood Symptoms  Onset:      Description:   Depression: YES  Anxiety: YES- more ANXIETY     Accompanying Signs & Symptoms:  Still participating in activities that you used to enjoy: YES- doing the best she can     Fatigue: YES- always, but normal  Irritability: YES  Difficulty concentrating: YES  Changes in appetite: YES- eating more   Problems with sleep: YES  Heart racing/beating fast : YES  Thoughts of hurting yourself or others: none    History:   Recent stress: YES-  loosing job at the end of the year   Prior depression hospitalization: None  Family history of depression: no  Family history of anxiety: no    Precipitating factors:   Alcohol/drug use: no    Alleviating factors:  Was walking, which did help, but now is cold outside.     Therapies Tried and outcome: Cymbalta(but did NOT like the withdrawal side effects) and other medications.     SOC  The patient's  is losing his job at the end of the year; he will be receiving severance pay. The family received this information last 2017. She has also lost her car, which  on a family road trip in [place]. Her family owned 4 cars, 3 of them did not work. They were down to 1 car in the whole family, and this made her feel very limited and \"trapped\" for a while.     Depression and anxiety   Narda's family's social circumstances began her series of panic attacks; she has been trying to talk herself through this panic and emotions and calm herself down. She returned to speak to her therapist to try to manage some of these symptoms and fears. She says she feels as if right now her anxiety is so extreme that her depression barely even registers for her. She has nervous energy, and will be exhausted but also very agitated and hyper at the same time. The patient does not want to go back on Cymbalta, due " to horrible withdrawal symptoms she experienced the last time on it. Most importantly, she wants to be on a medication without a lot of weight side effects; Narda does not remember if Zoloft worked well or not when she was on it in the past.     Dentistry   The patient takes Ativan before dental work,     Influenza vaccination   Patient accepts an influenza vaccination today.     PROBLEMS TO ADD ON...    Problem list and histories reviewed & adjusted, as indicated.  Additional history: as documented    BP Readings from Last 3 Encounters:   11/20/17 122/88   09/06/16 120/80   10/02/15 100/82    Wt Readings from Last 3 Encounters:   11/20/17 64 kg (141 lb)   09/06/16 68.9 kg (152 lb)   10/02/15 61.1 kg (134 lb 9.6 oz)         Labs reviewed in EPIC    Reviewed and updated as needed this visit by clinical staffTobacco  Allergies  Meds  Problems  Med Hx  Surg Hx  Fam Hx  Soc Hx        Reviewed and updated as needed this visit by Provider       ROS:  Constitutional, HEENT, cardiovascular, pulmonary, gi and gu systems are negative, except as otherwise noted.    This document serves as a record of the services and decisions personally performed and made by Pricilla Bob MD. It was created on his/her behalf by Brennan Erickson, a trained medical scribe. The creation of this document is based the provider's statements to the medical scribe.  Kirt Erickson 9:15 AM, November 20, 2017  OBJECTIVE:     /88 (BP Location: Right arm, Patient Position: Chair, Cuff Size: Adult Regular)  Pulse 98  Temp 98.1  F (36.7  C) (Oral)  Resp 16  Wt 64 kg (141 lb)  SpO2 100%  BMI 23.11 kg/m2  Body mass index is 23.11 kg/(m^2).  GENERAL: healthy, alert and no distress  MS: no gross musculoskeletal defects noted, no edema  SKIN: no suspicious lesions or rashes  NEURO: Normal strength and tone, mentation intact and speech normal  PSYCH: mentation appears normal, affect normal/bright    Diagnostic Test Results:  none      ASSESSMENT/PLAN:     (M25.512) Left anterior shoulder pain  (primary encounter diagnosis)  Comment: The patient's left shoulder pain is currently well-controlled on Flexeril, continue on medication at 5 mg dose, this can help prevent pain and help her insomnia as well  Plan: Cyclobenzaprine (Flexeril) 5 mg tablet    (G47.00) Persistent insomnia  Comment chronic insomnia, cont Ambien 5 mg (medication previously taken by patient). Advised patient of proper usage, side effects, and dependent potential of the medication. Also continue on flexeril 5 mg (left shoulder pain) and Ativan 1 mg (to be taken prior to dental appointments), patient agrees.   Plan: zolpidem (AMBIEN) 5 MG tablet, LORazepam         (ATIVAN) 1 MG tablet, cyclobenzaprine         (FLEXERIL) 5 MG tablet          (B00.1) Cold sore  Comment: Reviewed patient's history of cold sores, they are well-controlled when on Zovirax, refilled her cold sore medication Zovirax 400 mg. .   Plan: acyclovir (ZOVIRAX) 400 MG tablet          (Z23) Need for prophylactic vaccination and inoculation against influenza  Comment: Patient accepts an influenza vaccination today.   Plan:  FLU VAC PRESRV FREE QUAD SPLIT VIR 3+YRS IM         [96097], Vaccine Administration, Initial         [02763]          (F33.1) Major depressive disorder, recurrent episode, moderate (H)  Comment: Discussed the patient's current life circumstances contributing to her anxiety and depression, begin on Zoloft 50 mg (which the patient was on in the past to manage symptoms), advised patient on side effects, patient agrees. F/u 4 wks  Plan: sertraline (ZOLOFT) 50 MG tablet          RTC in one month for follow-up.     The information in this document, created by the medical scribe for me, accurately reflects the services I personally performed and the decisions made by me. I have reviewed and approved this document for accuracy prior to leaving the patient care area.  Pricilla Bob MD  9:14 AM,  11/20/17    Pricilla Bob MD  [facility]    Physical Exam      Injectable Influenza Immunization Documentation    1.  Is the person to be vaccinated sick today?   No    2. Does the person to be vaccinated have an allergy to a component   of the vaccine?   No  Egg Allergy Algorithm Link    3. Has the person to be vaccinated ever had a serious reaction   to influenza vaccine in the past?   No    4. Has the person to be vaccinated ever had Guillain-Barré syndrome?   No    Form completed by [facility], MD                There are no Wet Read(s) to document.

## 2021-07-21 NOTE — PROGRESS NOTES
"   SUBJECTIVE:   CC: Narda Guerrero is an 50 year old woman who presents for preventive health visit.     {Split Bill scripting  The purpose of this visit is to discuss your medical history and prevent health problems before you are sick. You may be responsible for a co-pay, coinsurance, or deductible if your visit today includes services such as checking on a sore throat, having an x-ray or lab test, or treating and evaluating a new or existing condition :379654}  Patient has been advised of split billing requirements and indicates understanding: {Yes and No:033462}  Healthy Habits:    Do you get at least three servings of calcium containing foods daily (dairy, green leafy vegetables, etc.)? { :763039::\"yes\"}    Amount of exercise or daily activities, outside of work: { :630862}    Problems taking medications regularly { :616964::\"No\"}    Medication side effects: { :021435::\"No\"}    Have you had an eye exam in the past two years? { :254722}    Do you see a dentist twice per year? { :426597}    Do you have sleep apnea, excessive snoring or daytime drowsiness?{ :059698}  {Outside tests to abstract? :889891}    {additional problems to add (Optional):917753}    Today's PHQ-2 Score:   PHQ-2 (  [employer]) 10/4/2018 2016   Q1: Little interest or pleasure in doing things 2 0   Q2: Feeling down, depressed or hopeless 2 0   PHQ-2 Score 4 0   Q1: Little interest or pleasure in doing things More than half the days -   Q2: Feeling down, depressed or hopeless More than half the days -   PHQ-2 Score 4 -     {PHQ-2 LOOK IN ASSESSMENTS (Optional) :237939}  Abuse: Current or Past(Physical, Sexual or Emotional)- {YES/NO/NA:531967}  Do you feel safe in your environment? {YES/NO/NA:168239}        Social History     Tobacco Use     Smoking status: Former Smoker     Packs/day: 0.00     Years: 10.00     Pack years: 0.00     Types: Cigarettes     Quit date: 1/10/2000     Years since quittin.8     Smokeless tobacco: Never " "Used   Substance Use Topics     Alcohol use: Yes     Comment: occasionally     If you drink alcohol do you typically have >3 drinks per day or >7 drinks per week? {ETOH :682913}                     Reviewed orders with patient.  Reviewed health maintenance and updated orders accordingly - {Yes/No:330614::\"Yes\"}  {Chronicprobdata (Optional):182790}    {Mammo Decision Support (Optional):637689}    Pertinent mammograms are reviewed under the imaging tab.  History of abnormal Pap smear: {PAP HX:164463}  PAP / HPV Latest Ref Rng & Units 10/4/2018 7/28/2015 2/21/2012   PAP - NIL NIL NIL   HPV 16 DNA NEG:Negative Negative - -   HPV 18 DNA NEG:Negative Negative - -   OTHER HR HPV NEG:Negative Negative - -     Reviewed and updated as needed this visit by clinical staff                 Reviewed and updated as needed this visit by Provider                {HISTORY OPTIONS (Optional):725741}    ROS:  { :885815}    OBJECTIVE:   There were no vitals taken for this visit.  EXAM:  {Exam Choices:248705}    {Diagnostic Test Results (Optional):109986::\"Diagnostic Test Results:\",\"Labs reviewed in Epic\"}    ASSESSMENT/PLAN:   {Diag Picklist:368941}    Patient has been advised of split billing requirements and indicates understanding: {YES / NO:495747::\"Yes\"}  COUNSELING:   {FEMALE COUNSELING MESSAGES:062172::\"Reviewed preventive health counseling, as reflected in patient instructions\"}    Estimated body mass index is 23.2 kg/m  as calculated from the following:    Height as of 1/15/20: 1.683 m (5' 6.25\").    Weight as of 1/15/20: 65.7 kg (144 lb 12.8 oz).    {Weight Management Plan (ACO) Complete if BMI is abnormal-  Ages 18-64  BMI >24.9.  Age 65+ with BMI <23 or >30 (Optional):964117}    She reports that she quit smoking about 20 years ago. Her smoking use included cigarettes. She smoked 0.00 packs per day for 10.00 years. She has never used smokeless tobacco.      Counseling Resources:  ATP IV Guidelines  Pooled Cohorts Equation " Calculator  Breast Cancer Risk Calculator  BRCA-Related Cancer Risk Assessment: FHS-7 Tool  FRAX Risk Assessment  ICSI Preventive Guidelines  Dietary Guidelines for Americans, 2010  USDA's MyPlate  ASA Prophylaxis  Lung CA Screening    Pricilla Bob MD  [facility]   Attending Attestation (For Attendings USE Only)...

## 2021-10-24 ENCOUNTER — HEALTH MAINTENANCE LETTER (OUTPATIENT)
Age: 51
End: 2021-10-24

## 2021-10-26 DIAGNOSIS — G47.00 PERSISTENT INSOMNIA: ICD-10-CM

## 2021-10-27 RX ORDER — ZOLPIDEM TARTRATE 5 MG/1
TABLET ORAL
Qty: 30 TABLET | Refills: 0 | Status: SHIPPED | OUTPATIENT
Start: 2021-10-27 | End: 2021-12-08

## 2021-10-27 NOTE — TELEPHONE ENCOUNTER
zolpidem (AMBIEN) 5 MG tablet        Last Written Prescription Date:  5/4/2021  Last Fill Quantity: 30,   # refills: 5  Last Office Visit: 5/3/2021  E-VISIT  Future Office visit:       Routing refill request to provider for review/approval because:  Drug not on the [facility], [facility] or [facility] refill protocol or controlled substance    Amanda Salas RN

## 2021-11-05 ENCOUNTER — NURSE TRIAGE (OUTPATIENT)
Dept: FAMILY MEDICINE | Facility: CLINIC | Age: 51
End: 2021-11-05

## 2021-11-05 NOTE — TELEPHONE ENCOUNTER
"Patient initially calling wondering about monoclonial antibodies. Advised patient that we do not offer this here and she would need to check [employer], [facility] Website. Patient then voiced concerns about symptoms. See triage note below.    Nurse Triage SBAR    Is this a 2nd Level Triage? NO    Situation: COVID positive per patient on 11/3/2021. Tested at [facility] site.    Background:  Exposure to COVID positive sister and father around 10/19/2021.   Symptoms began with sore throat and coughing on 10/29/2021. Developed body aches on Tuesday, 11/2/2021.      Assessment: Fever of 104 last night. Fever broke overnight and patient reports she was \"good\" this morning with temperature of 99, but now reports temperature is up to 100. She reports she took Ibuprofen.     Denies shortness of breath. Speaking in full sentences with this RN over the phone. Voice hoarse.      Patient reports chills, body aches all over.    Patient reports muscle pain and brief chest discomfort last week which resolved on its own. Patient reports she thinks she has a hard time tasting jean pierre.   Patient reports she can \"taste sweet things, but everything else is a little bland.\" Sore throat.    (See information below for more triage details.)    Recommendation:     Protocol Recommended Disposition: Telephone advice      Reason for Disposition    [1] COVID-19 diagnosed by positive lab test AND [2] mild symptoms (e.g., cough, fever, others) AND [3] no complications or SOB    Additional Information    Negative: SEVERE difficulty breathing (e.g., struggling for each breath, speaks in single words)    Negative: Difficult to awaken or acting confused (e.g., disoriented, slurred speech)    Negative: Bluish (or gray) lips or face now    Negative: Shock suspected (e.g., cold/pale/clammy skin, too weak to stand, low BP, rapid pulse)    Negative: Sounds like a life-threatening emergency to the triager    Negative: [1] " COVID-19 exposure AND [2] no symptoms    Negative: COVID-19 vaccine reaction suspected (e.g., fever, headache, muscle aches) occurring 1 to 3 days after getting vaccine    Negative: COVID-19 vaccine, questions about    Negative: COVID-19 and breastfeeding, questions about    Negative: [1] Lives with someone known to have influenza (flu test positive) AND [2] flu-like symptoms (e.g., cough, runny nose, sore throat, SOB; with or without fever)    Negative: [1] Adult with possible COVID-19 symptoms AND [2] triager concerned about severity of symptoms or other causes    Negative: SEVERE or constant chest pain or pressure (Exception: mild central chest pain, present only when coughing)    Negative: MODERATE difficulty breathing (e.g., speaks in phrases, SOB even at rest, pulse 100-120)    Negative: [1] Headache AND [2] stiff neck (can't touch chin to chest)    Negative: MILD difficulty breathing (e.g., minimal/no SOB at rest, SOB with walking, pulse <100)    Negative: Chest pain or pressure    Negative: Patient sounds very sick or weak to the triager    Negative: Fever > 103 F (39.4 C)    Negative: [1] Fever > 101 F (38.3 C) AND [2] age > 60 years    Negative: [1] Fever > 100.0 F (37.8 C) AND [2] bedridden (e.g., nursing home patient, CVA, chronic illness, recovering from surgery)    Negative: HIGH RISK for severe COVID complications (e.g., age > 64 years, obesity with BMI > 25, pregnant, chronic lung disease or other chronic medical condition)  (Exception: Already seen by PCP and no new or worsening symptoms.)    Negative: [1] HIGH RISK patient AND [2] influenza is widespread in the community AND [3] ONE OR MORE respiratory symptoms: cough, sore throat, runny or stuffy nose    Negative: [1] HIGH RISK patient AND [2] influenza exposure within the last 7 days AND [3] ONE OR MORE respiratory symptoms: cough, sore throat, runny or stuffy nose    Negative: Fever present > 3 days (72 hours)    Negative: [1] COVID-19 infection  "suspected by caller or triager AND [2] mild symptoms (cough, fever, or others) AND [3] negative COVID-19 rapid test    Negative: [1] Fever returns after gone for over 24 hours AND [2] symptoms worse or not improved    Negative: Cough present > 3 weeks    Negative: [1] Continuous (nonstop) coughing interferes with work or school AND [2] no improvement using cough treatment per protocol    Negative: [1] COVID-19 diagnosed by positive lab test AND [2] NO symptoms (e.g., cough, fever, others)    Answer Assessment - Initial Assessment Questions  1. COVID-19 DIAGNOSIS: \"Who made your Coronavirus (COVID-19) diagnosis?\" \"Was it confirmed by a positive lab test?\" If not diagnosed by a HCP, ask \"Are there lots of cases (community spread) where you live?\" (See public health department website, if unsure)      [facility], COVID tested positive on 11/3/2021  2. COVID-19 EXPOSURE: \"Was there any known exposure to COVID before the symptoms began?\" CDC Definition of close contact: within 6 feet (2 meters) for a total of 15 minutes or more over a 24-hour period.       Yes, patient reports exposed to unvaccinated COVID positive sister on 10/19/2021. Patient states her sister through she was going through Cymbalta withdrawal, but was sick with COVID. Patient's father also positive for COVID.    3. ONSET: \"When did the COVID-19 symptoms start?\"       Patient reports she has seasonal allergies. Sore throat and coughing on 10/29/2021. Developed body aches on Tuesday, 11/2/2021.    4. WORST SYMPTOM: \"What is your worst symptom?\" (e.g., cough, fever, shortness of breath, muscle aches)      Cough, congestion   5. COUGH: \"Do you have a cough?\" If Yes, ask: \"How bad is the cough?\"        Yes, non productive cough. Infrequently, coughs \"something up.\"   6. FEVER: \"Do you have a fever?\" If Yes, ask: \"What is your temperature, how was it measured, and when did it start?\"      Yes, 104 last night. Fever broke overnight and patient reports " "she was \"good\" this morning with temperature of 99, but now reports temperature is up to 100. She reports she took Ibuprofen.   7. RESPIRATORY STATUS: \"Describe your breathing?\" (e.g., shortness of breath, wheezing, unable to speak)   Denies shortness of breath. Speaking in full sentences with this RN over the phone. Voice hoarse.      8. BETTER-SAME-WORSE: \"Are you getting better, staying the same or getting worse compared to yesterday?\"  If getting worse, ask, \"In what way?\"      Felt better last night when fever broke, but this morning she states \"I'm still not feeling great\"  9. HIGH RISK DISEASE: \"Do you have any chronic medical problems?\" (e.g., asthma, heart or lung disease, weak immune system, obesity, etc.)      No.  10. PREGNANCY: \"Is there any chance you are pregnant?\" \"When was your last menstrual period?\"        No  11. OTHER SYMPTOMS: \"Do you have any other symptoms?\"  (e.g., chills, fatigue, headache, loss of smell or taste, muscle pain, sore throat; new loss of smell or taste especially support the diagnosis of COVID-19)        Chills, muscle pain, reports she thinks she had a hard time tasting jean pierre.   \"Patient reports she can taste sweet things, but everything else is a little bland.\" Sore throat.    Protocols used: CORONAVIRUS (COVID-19) DIAGNOSED OR [id number] 8.25.2021    Abimbola PRADO RN   [facility]    "

## 2021-11-09 ENCOUNTER — HOSPITAL ENCOUNTER (EMERGENCY)
Facility: CLINIC | Age: 51
Discharge: HOME | End: 2021-11-09
Attending: EMERGENCY MEDICINE | Admitting: EMERGENCY MEDICINE
Payer: COMMERCIAL

## 2021-11-09 ENCOUNTER — APPOINTMENT (OUTPATIENT)
Dept: CT IMAGING | Facility: CLINIC | Age: 51
End: 2021-11-09
Attending: EMERGENCY MEDICINE
Payer: COMMERCIAL

## 2021-11-09 VITALS
BODY MASS INDEX: 20.5 KG/M2 | DIASTOLIC BLOOD PRESSURE: 59 MMHG | HEART RATE: 70 BPM | OXYGEN SATURATION: 99 % | SYSTOLIC BLOOD PRESSURE: 107 MMHG | WEIGHT: 128 LBS | TEMPERATURE: 98.7 F | RESPIRATION RATE: 18 BRPM

## 2021-11-09 DIAGNOSIS — N20.1 URETEROLITHIASIS: ICD-10-CM

## 2021-11-09 DIAGNOSIS — N20.0 KIDNEY STONE: ICD-10-CM

## 2021-11-09 LAB
ALBUMIN SERPL-MCNC: 3.4 G/DL (ref 3.4–5)
ALBUMIN UR-MCNC: NEGATIVE MG/DL
ALP SERPL-CCNC: 84 U/L (ref 40–150)
ALT SERPL W P-5'-P-CCNC: 28 U/L (ref 0–50)
ANION GAP SERPL CALCULATED.3IONS-SCNC: 6 MMOL/L (ref 3–14)
APPEARANCE UR: CLEAR
AST SERPL W P-5'-P-CCNC: 22 U/L (ref 0–45)
BASOPHILS # BLD AUTO: 0 10E3/UL (ref 0–0.2)
BASOPHILS NFR BLD AUTO: 0 %
BILIRUB SERPL-MCNC: 0.1 MG/DL (ref 0.2–1.3)
BILIRUB UR QL STRIP: NEGATIVE
BUN SERPL-MCNC: 14 MG/DL (ref 7–30)
CALCIUM SERPL-MCNC: 8.1 MG/DL (ref 8.5–10.1)
CHLORIDE BLD-SCNC: 109 MMOL/L (ref 94–109)
CO2 SERPL-SCNC: 26 MMOL/L (ref 20–32)
COLOR UR AUTO: ABNORMAL
CREAT SERPL-MCNC: 0.79 MG/DL (ref 0.52–1.04)
EOSINOPHIL # BLD AUTO: 0.1 10E3/UL (ref 0–0.7)
EOSINOPHIL NFR BLD AUTO: 1 %
ERYTHROCYTE [DISTWIDTH] IN BLOOD BY AUTOMATED COUNT: 12.9 % (ref 10–15)
GFR SERPL CREATININE-BSD FRML MDRD: 88 ML/MIN/1.73M2
GLUCOSE BLD-MCNC: 141 MG/DL (ref 70–99)
GLUCOSE UR STRIP-MCNC: NEGATIVE MG/DL
HCT VFR BLD AUTO: 37.7 % (ref 35–47)
HGB BLD-MCNC: 11.2 G/DL (ref 11.7–15.7)
HGB UR QL STRIP: ABNORMAL
IMM GRANULOCYTES # BLD: 0 10E3/UL
IMM GRANULOCYTES NFR BLD: 0 %
KETONES UR STRIP-MCNC: NEGATIVE MG/DL
LEUKOCYTE ESTERASE UR QL STRIP: NEGATIVE
LIPASE SERPL-CCNC: 128 U/L (ref 73–393)
LYMPHOCYTES # BLD AUTO: 0.7 10E3/UL (ref 0.8–5.3)
LYMPHOCYTES NFR BLD AUTO: 10 %
MCH RBC QN AUTO: 27.4 PG (ref 26.5–33)
MCHC RBC AUTO-ENTMCNC: 29.7 G/DL (ref 31.5–36.5)
MCV RBC AUTO: 92 FL (ref 78–100)
MONOCYTES # BLD AUTO: 0.3 10E3/UL (ref 0–1.3)
MONOCYTES NFR BLD AUTO: 4 %
NEUTROPHILS # BLD AUTO: 6 10E3/UL (ref 1.6–8.3)
NEUTROPHILS NFR BLD AUTO: 85 %
NITRATE UR QL: NEGATIVE
NRBC # BLD AUTO: 0 10E3/UL
NRBC BLD AUTO-RTO: 0 /100
PH UR STRIP: 7 [PH] (ref 5–7)
PLATELET # BLD AUTO: 204 10E3/UL (ref 150–450)
POTASSIUM BLD-SCNC: 3.9 MMOL/L (ref 3.4–5.3)
PROT SERPL-MCNC: 7.1 G/DL (ref 6.8–8.8)
RBC # BLD AUTO: 4.09 10E6/UL (ref 3.8–5.2)
RBC URINE: 4 /HPF
SODIUM SERPL-SCNC: 141 MMOL/L (ref 133–144)
SP GR UR STRIP: 1.02 (ref 1–1.03)
UROBILINOGEN UR STRIP-MCNC: NORMAL MG/DL
WBC # BLD AUTO: 7 10E3/UL (ref 4–11)
WBC URINE: 0 /HPF

## 2021-11-09 PROCEDURE — 96361 HYDRATE IV INFUSION ADD-ON: CPT

## 2021-11-09 PROCEDURE — 81001 URINALYSIS AUTO W/SCOPE: CPT | Performed by: EMERGENCY MEDICINE

## 2021-11-09 PROCEDURE — 250N000009 HC RX 250: Performed by: EMERGENCY MEDICINE

## 2021-11-09 PROCEDURE — 74177 CT ABD & PELVIS W/CONTRAST: CPT

## 2021-11-09 PROCEDURE — 250N000011 HC RX IP 250 OP 636: Performed by: EMERGENCY MEDICINE

## 2021-11-09 PROCEDURE — 258N000003 HC RX IP 258 OP 636: Performed by: EMERGENCY MEDICINE

## 2021-11-09 PROCEDURE — 85025 COMPLETE CBC W/AUTO DIFF WBC: CPT | Performed by: EMERGENCY MEDICINE

## 2021-11-09 PROCEDURE — 96374 THER/PROPH/DIAG INJ IV PUSH: CPT | Mod: 59

## 2021-11-09 PROCEDURE — 36415 COLL VENOUS BLD VENIPUNCTURE: CPT | Performed by: EMERGENCY MEDICINE

## 2021-11-09 PROCEDURE — 83690 ASSAY OF LIPASE: CPT | Performed by: EMERGENCY MEDICINE

## 2021-11-09 PROCEDURE — 99285 EMERGENCY DEPT VISIT HI MDM: CPT | Mod: 25

## 2021-11-09 PROCEDURE — 80053 COMPREHEN METABOLIC PANEL: CPT | Performed by: EMERGENCY MEDICINE

## 2021-11-09 RX ORDER — ONDANSETRON 4 MG/1
4 TABLET, ORALLY DISINTEGRATING ORAL EVERY 8 HOURS PRN
Qty: 10 TABLET | Refills: 0 | Status: SHIPPED | OUTPATIENT
Start: 2021-11-09 | End: 2021-11-12

## 2021-11-09 RX ORDER — TAMSULOSIN HYDROCHLORIDE 0.4 MG/1
0.4 CAPSULE ORAL DAILY
Qty: 7 CAPSULE | Refills: 0 | Status: SHIPPED | OUTPATIENT
Start: 2021-11-09 | End: 2021-11-30

## 2021-11-09 RX ORDER — ONDANSETRON 2 MG/ML
4 INJECTION INTRAMUSCULAR; INTRAVENOUS EVERY 30 MIN PRN
Status: DISCONTINUED | OUTPATIENT
Start: 2021-11-09 | End: 2021-11-09 | Stop reason: HOSPADM

## 2021-11-09 RX ORDER — SODIUM CHLORIDE 9 MG/ML
INJECTION, SOLUTION INTRAVENOUS CONTINUOUS
Status: DISCONTINUED | OUTPATIENT
Start: 2021-11-09 | End: 2021-11-09 | Stop reason: HOSPADM

## 2021-11-09 RX ORDER — OXYCODONE HYDROCHLORIDE 5 MG/1
5 TABLET ORAL EVERY 6 HOURS PRN
Qty: 6 TABLET | Refills: 0 | Status: SHIPPED | OUTPATIENT
Start: 2021-11-09 | End: 2024-08-01

## 2021-11-09 RX ORDER — HYDROMORPHONE HYDROCHLORIDE 1 MG/ML
0.5 INJECTION, SOLUTION INTRAMUSCULAR; INTRAVENOUS; SUBCUTANEOUS
Status: DISCONTINUED | OUTPATIENT
Start: 2021-11-09 | End: 2021-11-09 | Stop reason: HOSPADM

## 2021-11-09 RX ORDER — TAMSULOSIN HYDROCHLORIDE 0.4 MG/1
0.4 CAPSULE ORAL DAILY
Qty: 7 CAPSULE | Refills: 0 | Status: SHIPPED | OUTPATIENT
Start: 2021-11-09 | End: 2021-11-09

## 2021-11-09 RX ORDER — IOPAMIDOL 755 MG/ML
500 INJECTION, SOLUTION INTRAVASCULAR ONCE
Status: COMPLETED | OUTPATIENT
Start: 2021-11-09 | End: 2021-11-09

## 2021-11-09 RX ADMIN — SODIUM CHLORIDE 1000 ML: 9 INJECTION, SOLUTION INTRAVENOUS at 08:06

## 2021-11-09 RX ADMIN — SODIUM CHLORIDE: 9 INJECTION, SOLUTION INTRAVENOUS at 09:17

## 2021-11-09 RX ADMIN — ONDANSETRON 4 MG: 2 INJECTION INTRAMUSCULAR; INTRAVENOUS at 08:06

## 2021-11-09 RX ADMIN — IOPAMIDOL 64 ML: 755 INJECTION, SOLUTION INTRAVENOUS at 08:31

## 2021-11-09 RX ADMIN — SODIUM CHLORIDE 56 ML: 9 INJECTION, SOLUTION INTRAVENOUS at 08:31

## 2021-11-09 NOTE — DISCHARGE INSTRUCTIONS
Please drink plenty of fluids.  Strain for stones.  Return if fevers, nausea or vomiting, intractable pain or other acute changes.  May use ibuprofen and tylenol for pain.  If worsening pain try oxycodone.  Do not drink drive or operate heavy machinery with this medication.  Follow up with urology in a week.    Discharge Instructions  Kidney Stones    Kidney stones are a common problem that can cause a lot of pain but fortunately are usually not dangerous. Kidney stones form in the kidney and then can cause a blockage (obstruction) of the flow of urine from the kidney which leads to pain. Most patients can manage kidney stones at home (without a hospital stay).  However, sometimes your condition may be worse than it seemed at first, or may get worse with time. Most kidney stones will pass on their own, but occasionally stones may need to be removed by an urologist.    Generally, every Emergency Department visit should have a follow-up clinic visit with either a primary or a specialty clinic/provider. Please follow-up as instructed by your emergency provider today.      Return to the Emergency Department if:  Your pain is not controlled despite the medications provided or recommended.  You are vomiting (throwing up) and cannot keep fluids or medications down.  You develop a fever (>100.4 F).  You feel much more ill or develop new symptoms.  What can I do to help myself?  Be sure to drink plenty of fluids.  If instructed to do so, strain your urine (pee) with the urine strainer you were provided with today. Your stone may look like a grain of sand or a small pebble. Collect any stones in the cup provided and bring to your follow-up appointment.  Staying active is good, and may help the stone to pass. You may do whatever you feel up to doing without restrictions.   Treatment:  Non-steroidal anti-inflammatory drugs (NSAIDs). This includes prescription medicines like Toradol  (ketorolac) and non-prescription medicines  like Advil  (ibuprofen) and Nuprin  (ibuprofen) and Naproxen. These pain relievers are very effective for kidney stones.  Nausea (sick to your stomach) medication.  Nausea and vomiting are common with kidney stones, so your provider may send you home with medicine for this.   Flomax  (tamsulosin). This medicine is sometimes used for men with prostate problems, but also can help kidney stones to pass. Its effectiveness is controversial or questionable so it is prescribed in certain situations. This medicine can lower blood pressure, and you may feel faint/lightheaded, especially when you first stand up. Be sure to get up gradually, sit down if you feel faint, and avoid activity where feeling faint would be dangerous, such as climbing ladders.  If you were given a prescription for medicine here today, be sure to read all of the information (including the package insert) that comes with your prescription.  This will include important information about the medicine, its side effects, and any warnings that you need to know about.  The pharmacist who fills the prescription can provide more information and answer questions you may have about the medicine.  If you have questions or concerns that the pharmacist cannot address, please call or return to the Emergency Department.   Remember that you can always come back to the Emergency Department if you are not able to see your regular provider in the amount of time listed above, if you get any new symptoms, or if there is anything that worries you.

## 2021-11-09 NOTE — H&P (VIEW-ONLY)
History   Chief Complaint:  Abdominal Pain and covid positive       HPI   Narda Guerrero is a 51 year old female, Covid+ 6 days ago, who presents with right flank pain with radiation into her abdomen that started around 4 AM this morning. Also mentions that she feels weak and has vomited secondary to pain. The patient has not had to urinate since onset and therefore cannot attest to dysuria or hematuria. Has not taken any medications given that she was unsure what to take. She had similar symptoms that resolved within 30 minutes about a week ago. Her sister and her mother have a history of kidney stones but she denies a personal history. Regarding her Covid-19 infection, her symptoms started a week ago. She has had intermittent fevers of 104F, none today. Denies chest pain, shortness of breath, or diarrhea.     Review of Systems   All other systems reviewed and are negative.      Allergies:  Ortho Tri-Cyclen [Ortho Tri-Cyclen Lo]  Sulfa Drugs    Medications:  Ambien  Flexeril    Past Medical History:     Depression  Anxiety  TMJ  Sedative, hypnotic or anxiolytic dependence    Past Surgical History:    D & C  LEEP   Breast implants    Family History:    Father: Heart disease, atrial fibrillation, hyperlipidemia, type 2 diabetes  Mother: Myasthenia gravis  Sister: Kidney stones    Social History:  The patient presents to the ED with her .  Does have a sister.     Physical Exam     Patient Vitals for the past 24 hrs:   BP Temp Temp src Pulse Resp SpO2 Weight   11/09/21 1000 107/59 -- -- 70 18 99 % --   11/09/21 0930 98/49 -- -- 71 16 99 % --   11/09/21 0900 110/56 -- -- 72 18 98 % --   11/09/21 0850 111/48 -- -- 81 18 100 % --   11/09/21 0802 -- -- -- -- 20 99 % --   11/09/21 0801 127/55 -- -- 79 -- -- --   11/09/21 0622 (!) 136/113 (!) 95.8  F (35.4  C) Temporal 76 26 91 % 58.1 kg (128 lb)       Physical Exam  General: Resting on the bed.  Head: No obvious trauma to head.  Ears, Nose, Throat:  External  ears normal.  Nose normal.    Eyes:  Conjunctivae clear.  Pupils are equal, round, and reactive.   Neck: Normal range of motion.  Neck supple.   CV: Regular rate and rhythm.  No murmurs.      Respiratory: Effort normal and breath sounds normal.  No wheezing or crackles.   Gastrointestinal: Soft.  No distension. There is RLQ tenderness.  There is no rigidity, no rebound and no guarding.   Musculoskeletal: right cva tenderness   Neuro: Alert. Moving all extremities appropriately.  Normal speech.    Skin: Skin is warm and dry.  No rash noted.     Emergency Department Course     Imaging:  CT Abdomen/Pelvis with IV contrast:   7 mm stone in the distal right ureter with associated mild  to moderate hydroureter/hydronephrosis.    Report per radiology    Laboratory:  Labs Ordered and Resulted from Time of ED Arrival to Time of ED Departure   COMPREHENSIVE METABOLIC PANEL - Abnormal       Result Value    Sodium 141      Potassium 3.9      Chloride 109      Carbon Dioxide (CO2) 26      Anion Gap 6      Urea Nitrogen 14      Creatinine 0.79      Calcium 8.1 (*)     Glucose 141 (*)     Alkaline Phosphatase 84      AST 22      ALT 28      Protein Total 7.1      Albumin 3.4      Bilirubin Total 0.1 (*)     GFR Estimate 88     ROUTINE UA WITH MICROSCOPIC REFLEX TO CULTURE - Abnormal    Color Urine Light Yellow      Appearance Urine Clear      Glucose Urine Negative      Bilirubin Urine Negative      Ketones Urine Negative      Specific Gravity Urine 1.023      Blood Urine Trace (*)     pH Urine 7.0      Protein Albumin Urine Negative      Urobilinogen Urine Normal      Nitrite Urine Negative      Leukocyte Esterase Urine Negative      RBC Urine 4 (*)     WBC Urine 0     CBC WITH PLATELETS AND DIFFERENTIAL - Abnormal    WBC Count 7.0      RBC Count 4.09      Hemoglobin 11.2 (*)     Hematocrit 37.7      MCV 92      MCH 27.4      MCHC 29.7 (*)     RDW 12.9      Platelet Count 204      % Neutrophils 85      % Lymphocytes 10      %  Monocytes 4      % Eosinophils 1      % Basophils 0      % Immature Granulocytes 0      NRBCs per 100 WBC 0      Absolute Neutrophils 6.0      Absolute Lymphocytes 0.7 (*)     Absolute Monocytes 0.3      Absolute Eosinophils 0.1      Absolute Basophils 0.0      Absolute Immature Granulocytes 0.0      Absolute NRBCs 0.0     LIPASE - Normal    Lipase 128        Emergency Department Course:  Reviewed:  I reviewed nursing notes, vitals and past medical history    Assessments:  0637 I obtained history and examined the patient as noted above.     1023 I rechecked the patient and explained findings.     Interventions:  0806 Zofran 4 mg IV  0806 NS 1L IV    Disposition:  The patient was discharged to home.     Impression & Plan     Medical Decision Makin-year-old female presents with right flank pain.  She is also noted to be Covid positive.  Broad differential was pursued include not limited to pancreatitis, hepatitis, cholecystitis, kidney stone, pyelonephritis, infected stone, etc.  CBC without leukocytosis or anemia.  CMP without acute electrolyte, metabolic or renal dysfunction.  LFTs and lipase are reassuring, not concerning for obstructive biliary process, hepatitis, pancreatitis.  UA with small blood but no signs of infection.  CT scan confirms a 7 mm stone in the distal right ureter.  Pain is controlled without any intervention.  Recommend straining for stone, Flomax, Zofran, oxycodone as needed.  Strict return precautions were discussed.  Patient felt well after discharge.  Recommend close follow-up with urology.  Discussed this exam may pass on its own but very well may need intervention as well hence the urology follow-up is required.      Diagnosis:    ICD-10-CM    1. Kidney stone  N20.0    2. Ureterolithiasis  N20.1        Scribe Disclosure:  MIKE, Palomo Mcpherson, am serving as a scribe at 6:34 AM on 2021 to document services personally performed by Shelia Viera MD based on my observations and  the provider's statements to me.               Shelia Viera MD  11/09/21 4960

## 2021-11-09 NOTE — ED PROVIDER NOTES
History   Chief Complaint:  Abdominal Pain and covid positive       HPI   Narda Guerrero is a 51 year old female, Covid+ 6 days ago, who presents with right flank pain with radiation into her abdomen that started around 4 AM this morning. Also mentions that she feels weak and has vomited secondary to pain. The patient has not had to urinate since onset and therefore cannot attest to dysuria or hematuria. Has not taken any medications given that she was unsure what to take. She had similar symptoms that resolved within 30 minutes about a week ago. Her sister and her mother have a history of kidney stones but she denies a personal history. Regarding her Covid-19 infection, her symptoms started a week ago. She has had intermittent fevers of 104F, none today. Denies chest pain, shortness of breath, or diarrhea.     Review of Systems   All other systems reviewed and are negative.      Allergies:  Ortho Tri-Cyclen [Ortho Tri-Cyclen Lo]  Sulfa Drugs    Medications:  Ambien  Flexeril    Past Medical History:     Depression  Anxiety  TMJ  Sedative, hypnotic or anxiolytic dependence    Past Surgical History:    D & C  LEEP   Breast implants    Family History:    Father: Heart disease, atrial fibrillation, hyperlipidemia, type 2 diabetes  Mother: Myasthenia gravis  Sister: Kidney stones    Social History:  The patient presents to the ED with her .  Does have a sister.     Physical Exam     Patient Vitals for the past 24 hrs:   BP Temp Temp src Pulse Resp SpO2 Weight   11/09/21 1000 107/59 -- -- 70 18 99 % --   11/09/21 0930 98/49 -- -- 71 16 99 % --   11/09/21 0900 110/56 -- -- 72 18 98 % --   11/09/21 0850 111/48 -- -- 81 18 100 % --   11/09/21 0802 -- -- -- -- 20 99 % --   11/09/21 0801 127/55 -- -- 79 -- -- --   11/09/21 0622 (!) 136/113 (!) 95.8  F (35.4  C) Temporal 76 26 91 % 58.1 kg (128 lb)       Physical Exam  General: Resting on the bed.  Head: No obvious trauma to head.  Ears, Nose, Throat:  External  ears normal.  Nose normal.    Eyes:  Conjunctivae clear.  Pupils are equal, round, and reactive.   Neck: Normal range of motion.  Neck supple.   CV: Regular rate and rhythm.  No murmurs.      Respiratory: Effort normal and breath sounds normal.  No wheezing or crackles.   Gastrointestinal: Soft.  No distension. There is RLQ tenderness.  There is no rigidity, no rebound and no guarding.   Musculoskeletal: right cva tenderness   Neuro: Alert. Moving all extremities appropriately.  Normal speech.    Skin: Skin is warm and dry.  No rash noted.     Emergency Department Course     Imaging:  CT Abdomen/Pelvis with IV contrast:   7 mm stone in the distal right ureter with associated mild  to moderate hydroureter/hydronephrosis.    Report per radiology    Laboratory:  Labs Ordered and Resulted from Time of ED Arrival to Time of ED Departure   COMPREHENSIVE METABOLIC PANEL - Abnormal       Result Value    Sodium 141      Potassium 3.9      Chloride 109      Carbon Dioxide (CO2) 26      Anion Gap 6      Urea Nitrogen 14      Creatinine 0.79      Calcium 8.1 (*)     Glucose 141 (*)     Alkaline Phosphatase 84      AST 22      ALT 28      Protein Total 7.1      Albumin 3.4      Bilirubin Total 0.1 (*)     GFR Estimate 88     ROUTINE UA WITH MICROSCOPIC REFLEX TO CULTURE - Abnormal    Color Urine Light Yellow      Appearance Urine Clear      Glucose Urine Negative      Bilirubin Urine Negative      Ketones Urine Negative      Specific Gravity Urine 1.023      Blood Urine Trace (*)     pH Urine 7.0      Protein Albumin Urine Negative      Urobilinogen Urine Normal      Nitrite Urine Negative      Leukocyte Esterase Urine Negative      RBC Urine 4 (*)     WBC Urine 0     CBC WITH PLATELETS AND DIFFERENTIAL - Abnormal    WBC Count 7.0      RBC Count 4.09      Hemoglobin 11.2 (*)     Hematocrit 37.7      MCV 92      MCH 27.4      MCHC 29.7 (*)     RDW 12.9      Platelet Count 204      % Neutrophils 85      % Lymphocytes 10      %  Monocytes 4      % Eosinophils 1      % Basophils 0      % Immature Granulocytes 0      NRBCs per 100 WBC 0      Absolute Neutrophils 6.0      Absolute Lymphocytes 0.7 (*)     Absolute Monocytes 0.3      Absolute Eosinophils 0.1      Absolute Basophils 0.0      Absolute Immature Granulocytes 0.0      Absolute NRBCs 0.0     LIPASE - Normal    Lipase 128        Emergency Department Course:  Reviewed:  I reviewed nursing notes, vitals and past medical history    Assessments:  0637 I obtained history and examined the patient as noted above.     1023 I rechecked the patient and explained findings.     Interventions:  0806 Zofran 4 mg IV  0806 NS 1L IV    Disposition:  The patient was discharged to home.     Impression & Plan     Medical Decision Makin-year-old female presents with right flank pain.  She is also noted to be Covid positive.  Broad differential was pursued include not limited to pancreatitis, hepatitis, cholecystitis, kidney stone, pyelonephritis, infected stone, etc.  CBC without leukocytosis or anemia.  CMP without acute electrolyte, metabolic or renal dysfunction.  LFTs and lipase are reassuring, not concerning for obstructive biliary process, hepatitis, pancreatitis.  UA with small blood but no signs of infection.  CT scan confirms a 7 mm stone in the distal right ureter.  Pain is controlled without any intervention.  Recommend straining for stone, Flomax, Zofran, oxycodone as needed.  Strict return precautions were discussed.  Patient felt well after discharge.  Recommend close follow-up with urology.  Discussed this exam may pass on its own but very well may need intervention as well hence the urology follow-up is required.      Diagnosis:    ICD-10-CM    1. Kidney stone  N20.0    2. Ureterolithiasis  N20.1        Scribe Disclosure:  MIKE, Palomo Mcpherson, am serving as a scribe at 6:34 AM on 2021 to document services personally performed by Shelia Viera MD based on my observations and  the provider's statements to me.               Shelia Viera MD  11/09/21 9103

## 2021-11-29 ENCOUNTER — ANCILLARY PROCEDURE (OUTPATIENT)
Dept: GENERAL RADIOLOGY | Facility: CLINIC | Age: 51
End: 2021-11-29
Attending: UROLOGY
Payer: COMMERCIAL

## 2021-11-29 DIAGNOSIS — N20.1 RIGHT URETERAL STONE: Primary | ICD-10-CM

## 2021-11-29 DIAGNOSIS — N20.1 RIGHT URETERAL STONE: ICD-10-CM

## 2021-11-29 PROCEDURE — 74019 RADEX ABDOMEN 2 VIEWS: CPT | Performed by: RADIOLOGY

## 2021-11-30 ENCOUNTER — OFFICE VISIT (OUTPATIENT)
Dept: UROLOGY | Facility: CLINIC | Age: 51
End: 2021-11-30
Payer: COMMERCIAL

## 2021-11-30 VITALS
DIASTOLIC BLOOD PRESSURE: 68 MMHG | BODY MASS INDEX: 21.16 KG/M2 | HEIGHT: 65 IN | WEIGHT: 127 LBS | SYSTOLIC BLOOD PRESSURE: 114 MMHG

## 2021-11-30 DIAGNOSIS — N20.1 RIGHT URETERAL STONE: Primary | ICD-10-CM

## 2021-11-30 DIAGNOSIS — Z11.59 ENCOUNTER FOR SCREENING FOR OTHER VIRAL DISEASES: ICD-10-CM

## 2021-11-30 LAB
ALBUMIN UR-MCNC: NEGATIVE MG/DL
APPEARANCE UR: CLEAR
BILIRUB UR QL STRIP: NEGATIVE
COLOR UR AUTO: YELLOW
GLUCOSE UR STRIP-MCNC: NEGATIVE MG/DL
HGB UR QL STRIP: NEGATIVE
KETONES UR STRIP-MCNC: NEGATIVE MG/DL
LEUKOCYTE ESTERASE UR QL STRIP: ABNORMAL
NITRATE UR QL: NEGATIVE
PH UR STRIP: 5.5 [PH] (ref 5–7)
SP GR UR STRIP: 1.01 (ref 1–1.03)
UROBILINOGEN UR STRIP-ACNC: 0.2 E.U./DL

## 2021-11-30 PROCEDURE — 99204 OFFICE O/P NEW MOD 45 MIN: CPT | Performed by: UROLOGY

## 2021-11-30 PROCEDURE — 81003 URINALYSIS AUTO W/O SCOPE: CPT | Mod: QW | Performed by: UROLOGY

## 2021-11-30 RX ORDER — TAMSULOSIN HYDROCHLORIDE 0.4 MG/1
0.4 CAPSULE ORAL DAILY
Qty: 30 CAPSULE | Refills: 0 | Status: SHIPPED | OUTPATIENT
Start: 2021-11-30 | End: 2024-08-01

## 2021-11-30 ASSESSMENT — PAIN SCALES - GENERAL: PAINLEVEL: MILD PAIN (2)

## 2021-11-30 ASSESSMENT — MIFFLIN-ST. JEOR: SCORE: 1201.95

## 2021-11-30 NOTE — LETTER
11/30/2021       RE: Narda Guerrero  [address]  [facility] [id number]     Dear Colleague,    Thank you for referring your patient, Narda Guerrero, to the [facility] at [facility]. Please see a copy of my visit note below.    Name: Narda Guerrero   MRN: [id number]  YOB: 1970    Assessment and Plan:  51 year old female with a 7 mm right distal stone that has not passed based on repeat KUB    1. Right ureteral stone  We discussed shock wave lithotripsy, ureteroscopy and percutaneous nephrolithotomy as the main surgical approaches to upper urinary tract stones.  We reviewed risks and benefits including but not limited to the following: bleeding, infection, damage to adjacent organs, ureteral stricture, need for staged treatment, incomplete stone removal.    Ultimately the patient has elected to proceed with right ureteroscopy because of ability to remove stone.  Reemphasized specific risks to ureteroscopy, including but not limited to, bleeding, infection, need for stent/stent related symptoms, injury to ureter, need for second procedure.     Plan:  Schedule right ureteroscopy  We will not do further metabolic evaluation 24-hour urine in addition to standard follow-up    - UA without Microscopic [[id number]]; Future  - UA without Microscopic [[id number]]      Yosvany Garcia MD  November 30, 2021         Chief Complaint: Right flank pain due to right ureteral stone    History of Present Illness:  Narda Guerrero is a 51 year old female seen in consultation from right ureteral stone.    The current episode was first found due to acute flank pain on 11/9/2021, but did have pain a month before this.  CT performed on 11/9 (images personally reviewed) demonstrated:  Right Kidney: 7 mm right ureteral stone  Left Kidney: no stones    KUB on 11/29 with persistent ureteral stone    Currently, they are experiencing minimal  flank pain, no nausea, no vomiting, no hematuria, or no dysuria.  They have not experienced recent stone passage.     No SE from tamsulosin.    Pertinent stone history:  -- Personal stone history  -- Prior stone procedure  -- Prior stone analysis  -- Prior metabolic evaluation  + Family stone history    Pertinent stone medical history  -- Diabetes  -- Neurologic disease limiting mobility   -- Osteoporosis  -- Gout  -- Gastric bypass surgery  -- Sarcoidosis  -- Inflammatory bowel disease  -- History of non-stone  surgery     Pertinent medications:  -- Antiplatelet  -- Anticoagulant  -- Topiramate   -- Thiazaide  -- Potassium supplement      I reviewed internal labs, of which pertinent ones include:   Hemoglobin   Date Value Ref Range Status   11/09/2021 11.2 (L) 11.7 - 15.7 g/dL Final   11/16/2020 14.2 11.7 - 15.7 g/dL Final     Potassium   Date Value Ref Range Status   11/09/2021 3.9 3.4 - 5.3 mmol/L Final   11/16/2020 4.1 3.4 - 5.3 mmol/L Final     Creatinine   Date Value Ref Range Status   11/09/2021 0.79 0.52 - 1.04 mg/dL Final   11/16/2020 0.80 0.52 - 1.04 mg/dL Final     pH Urine   Date Value Ref Range Status   11/30/2021 5.5 5.0 - 7.0 Final   12/31/2013 5.5 5.0 - 7.0 pH Final       I reviewed internal records which in summarized above.         Past Medical History:  Past Medical History:   Diagnosis Date     Abnormal glandular Papanicolaou smear of cervix 23 yo    normal since LEEP     Depressive disorder, not elsewhere classified      Generalized anxiety disorder      Headache      Insomnia, unspecified      Major depressive disorder, single episode, severe, without mention of psychotic behavior 2/5/2010     Unspecified sinusitis (chronic)             Past Surgical History:  Past Surgical History:   Procedure Laterality Date     COSMETIC SURGERY  1997     D & C  2000    for miscarriage     LEEP TX, CERVICAL  ~23yo    LEEP TX Cervical     ZZC NONSPECIFIC PROCEDURE  1997    breast implants     ZZC NONSPECIFIC  PROCEDURE  ,     2 NSVDs            Social History:  Social History     Tobacco Use     Smoking status: Former Smoker     Packs/day: 0.00     Years: 10.00     Pack years: 0.00     Types: Cigarettes     Quit date: 1/10/2000     Years since quittin.9     Smokeless tobacco: Never Used   Substance Use Topics     Alcohol use: Yes     Comment: occasionally     Drug use: No            Family History:  Family History   Problem Relation Age of Onset     Heart Disease Father      Cardiovascular Father         atrial fibrillation     Lipids Father         high     Diabetes Father         type 2     C.A.D. Paternal Grandmother         Quadruple bypass     Cardiovascular Paternal Grandmother      Heart Disease Paternal Grandmother      Genitourinary Problems Mother         kidney stone/ kidney bag     Musculoskeletal Disorder Mother         myasthenia gravis     Alcohol/Drug Maternal Grandmother         etoh     Alcohol/Drug Maternal Grandfather         etoh     Heart Disease Paternal Uncle         Quadruple bypass     Cardiovascular Paternal Uncle      Cardiovascular Paternal Uncle      C.A.D. Paternal Uncle      C.A.D. Paternal Uncle      Heart Disease Paternal Uncle         Quadruple bypass     Cancer Paternal Uncle         throat-smoker     Alcohol/Drug Maternal Uncle         alcohol     Alcohol/Drug Maternal Uncle         etoh     Genitourinary Problems Sister         kidney stones            Allergies:  Allergies   Allergen Reactions     Ortho Tri-Cyclen [Ortho Tri-Cyclen Lo] Nausea and Vomiting     Sulfa Drugs      Bilateral eye swelling.             Medications:  Current Outpatient Medications   Medication Sig     cyclobenzaprine (FLEXERIL) 5 MG tablet TAKE ONE TABLET BY MOUTH AT BEDTIME     oxyCODONE (ROXICODONE) 5 MG tablet Take 1 tablet (5 mg) by mouth every 6 hours as needed for pain     ZOLMitriptan (ZOMIG-ZMT) 5 MG ODT Take 1 tablet (5 mg) by mouth at onset of headache for migraine May repeat in 2  "hours. Max 2 tablets/24 hours.     zolpidem (AMBIEN) 5 MG tablet TAKE 1 TABLET BY MOUTH AT BEDTIME     tamsulosin (FLOMAX) 0.4 MG capsule Take 1 capsule (0.4 mg) by mouth daily     No current facility-administered medications for this visit.       Review of Systems:   ROS: 10 point ROS neg other than the symptoms noted above in the HPI.    Physical Exam:  B/P: 114/68, T: Data Unavailable, P: Data Unavailable, R: Data Unavailable  Estimated body mass index is 21.13 kg/m  as calculated from the following:    Height as of this encounter: 1.651 m (5' 5\").    Weight as of this encounter: 57.6 kg (127 lb).  General Appearance Adult: Alert, no acute distress, oriented  Lungs: no respiratory distress, or pursed lip breathing  Neuro: Alert, oriented, speech and mentation normal  Psych: affect and mood normal    Outside records:   I spent 0 minutes reviewing outside records.      Yosvany Garcia MD      "

## 2021-11-30 NOTE — NURSING NOTE
Chief Complaint   Patient presents with     Kidney stone     f/u after visit in ED     Pt does not think she has passed stone, complains of intermittent pain    Paris Hernandez, CMA

## 2021-11-30 NOTE — PROGRESS NOTES
Name: Narda Guerrero   MRN: [id number]  YOB: 1970    Assessment and Plan:  51 year old female with a 7 mm right distal stone that has not passed based on repeat KUB    1. Right ureteral stone  We discussed shock wave lithotripsy, ureteroscopy and percutaneous nephrolithotomy as the main surgical approaches to upper urinary tract stones.  We reviewed risks and benefits including but not limited to the following: bleeding, infection, damage to adjacent organs, ureteral stricture, need for staged treatment, incomplete stone removal.    Ultimately the patient has elected to proceed with right ureteroscopy because of ability to remove stone.  Reemphasized specific risks to ureteroscopy, including but not limited to, bleeding, infection, need for stent/stent related symptoms, injury to ureter, need for second procedure.     Plan:  Schedule right ureteroscopy  We will not do further metabolic evaluation 24-hour urine in addition to standard follow-up    - UA without Microscopic [[id number]]; Future  - UA without Microscopic [[id number]]      Yosvany Garcia MD  November 30, 2021         Chief Complaint: Right flank pain due to right ureteral stone    History of Present Illness:  Narda Guerrero is a 51 year old female seen in consultation from right ureteral stone.    The current episode was first found due to acute flank pain on 11/9/2021, but did have pain a month before this.  CT performed on 11/9 (images personally reviewed) demonstrated:  Right Kidney: 7 mm right ureteral stone  Left Kidney: no stones    KUB on 11/29 with persistent ureteral stone    Currently, they are experiencing minimal flank pain, no nausea, no vomiting, no hematuria, or no dysuria.  They have not experienced recent stone passage.     No SE from tamsulosin.    Pertinent stone history:  -- Personal stone history  -- Prior stone procedure  -- Prior stone analysis  -- Prior metabolic evaluation  + Family stone history    Pertinent  stone medical history  -- Diabetes  -- Neurologic disease limiting mobility   -- Osteoporosis  -- Gout  -- Gastric bypass surgery  -- Sarcoidosis  -- Inflammatory bowel disease  -- History of non-stone  surgery     Pertinent medications:  -- Antiplatelet  -- Anticoagulant  -- Topiramate   -- Thiazaide  -- Potassium supplement      I reviewed internal labs, of which pertinent ones include:   Hemoglobin   Date Value Ref Range Status   2021 11.2 (L) 11.7 - 15.7 g/dL Final   2020 14.2 11.7 - 15.7 g/dL Final     Potassium   Date Value Ref Range Status   2021 3.9 3.4 - 5.3 mmol/L Final   2020 4.1 3.4 - 5.3 mmol/L Final     Creatinine   Date Value Ref Range Status   2021 0.79 0.52 - 1.04 mg/dL Final   2020 0.80 0.52 - 1.04 mg/dL Final     pH Urine   Date Value Ref Range Status   2021 5.5 5.0 - 7.0 Final   2013 5.5 5.0 - 7.0 pH Final       I reviewed internal records which in summarized above.         Past Medical History:  Past Medical History:   Diagnosis Date     Abnormal glandular Papanicolaou smear of cervix 23 yo    normal since LEEP     Depressive disorder, not elsewhere classified      Generalized anxiety disorder      Headache      Insomnia, unspecified      Major depressive disorder, single episode, severe, without mention of psychotic behavior 2010     Unspecified sinusitis (chronic)             Past Surgical History:  Past Surgical History:   Procedure Laterality Date     COSMETIC SURGERY       D & C      for miscarriage     LEEP TX, CERVICAL  ~21yo    LEEP TX Cervical     ZC NONSPECIFIC PROCEDURE      breast implants     Z NONSPECIFIC PROCEDURE  ,     2 NSVDs            Social History:  Social History     Tobacco Use     Smoking status: Former Smoker     Packs/day: 0.00     Years: 10.00     Pack years: 0.00     Types: Cigarettes     Quit date: 1/10/2000     Years since quittin.9     Smokeless tobacco: Never Used   Substance Use  Topics     Alcohol use: Yes     Comment: occasionally     Drug use: No            Family History:  Family History   Problem Relation Age of Onset     Heart Disease Father      Cardiovascular Father         atrial fibrillation     Lipids Father         high     Diabetes Father         type 2     C.A.D. Paternal Grandmother         Quadruple bypass     Cardiovascular Paternal Grandmother      Heart Disease Paternal Grandmother      Genitourinary Problems Mother         kidney stone/ kidney bag     Musculoskeletal Disorder Mother         myasthenia gravis     Alcohol/Drug Maternal Grandmother         etoh     Alcohol/Drug Maternal Grandfather         etoh     Heart Disease Paternal Uncle         Quadruple bypass     Cardiovascular Paternal Uncle      Cardiovascular Paternal Uncle      C.A.D. Paternal Uncle      C.A.D. Paternal Uncle      Heart Disease Paternal Uncle         Quadruple bypass     Cancer Paternal Uncle         throat-smoker     Alcohol/Drug Maternal Uncle         alcohol     Alcohol/Drug Maternal Uncle         etoh     Genitourinary Problems Sister         kidney stones            Allergies:  Allergies   Allergen Reactions     Ortho Tri-Cyclen [Ortho Tri-Cyclen Lo] Nausea and Vomiting     Sulfa Drugs      Bilateral eye swelling.             Medications:  Current Outpatient Medications   Medication Sig     cyclobenzaprine (FLEXERIL) 5 MG tablet TAKE ONE TABLET BY MOUTH AT BEDTIME     oxyCODONE (ROXICODONE) 5 MG tablet Take 1 tablet (5 mg) by mouth every 6 hours as needed for pain     ZOLMitriptan (ZOMIG-ZMT) 5 MG ODT Take 1 tablet (5 mg) by mouth at onset of headache for migraine May repeat in 2 hours. Max 2 tablets/24 hours.     zolpidem (AMBIEN) 5 MG tablet TAKE 1 TABLET BY MOUTH AT BEDTIME     tamsulosin (FLOMAX) 0.4 MG capsule Take 1 capsule (0.4 mg) by mouth daily     No current facility-administered medications for this visit.       Review of Systems:   ROS: 10 point ROS neg other than the symptoms  "noted above in the HPI.    Physical Exam:  B/P: 114/68, T: Data Unavailable, P: Data Unavailable, R: Data Unavailable  Estimated body mass index is 21.13 kg/m  as calculated from the following:    Height as of this encounter: 1.651 m (5' 5\").    Weight as of this encounter: 57.6 kg (127 lb).  General Appearance Adult: Alert, no acute distress, oriented  Lungs: no respiratory distress, or pursed lip breathing  Neuro: Alert, oriented, speech and mentation normal  Psych: affect and mood normal    Outside records:   I spent 0 minutes reviewing outside records.      "

## 2021-12-01 ASSESSMENT — PATIENT HEALTH QUESTIONNAIRE - PHQ9: SUM OF ALL RESPONSES TO PHQ QUESTIONS 1-9: 18

## 2021-12-03 DIAGNOSIS — G47.00 PERSISTENT INSOMNIA: ICD-10-CM

## 2021-12-03 RX ORDER — CYCLOBENZAPRINE HCL 5 MG
TABLET ORAL
Qty: 60 TABLET | Refills: 0 | Status: SHIPPED | OUTPATIENT
Start: 2021-12-03 | End: 2021-12-08

## 2021-12-03 NOTE — TELEPHONE ENCOUNTER
Routing refill request to provider for review/approval because:  Drug not on the [facility] refill protocol     Los CULLEN RN

## 2021-12-06 ENCOUNTER — ANESTHESIA EVENT (OUTPATIENT)
Dept: SURGERY | Facility: CLINIC | Age: 51
End: 2021-12-06
Payer: COMMERCIAL

## 2021-12-06 ENCOUNTER — APPOINTMENT (OUTPATIENT)
Dept: GENERAL RADIOLOGY | Facility: CLINIC | Age: 51
End: 2021-12-06
Attending: UROLOGY
Payer: COMMERCIAL

## 2021-12-06 ENCOUNTER — ANESTHESIA (OUTPATIENT)
Dept: SURGERY | Facility: CLINIC | Age: 51
End: 2021-12-06
Payer: COMMERCIAL

## 2021-12-06 ENCOUNTER — HOSPITAL ENCOUNTER (OUTPATIENT)
Facility: CLINIC | Age: 51
Discharge: HOME | End: 2021-12-06
Admitting: UROLOGY
Payer: COMMERCIAL

## 2021-12-06 VITALS
HEIGHT: 66 IN | TEMPERATURE: 96.7 F | RESPIRATION RATE: 10 BRPM | BODY MASS INDEX: 21.07 KG/M2 | OXYGEN SATURATION: 99 % | HEART RATE: 71 BPM | WEIGHT: 131.1 LBS | SYSTOLIC BLOOD PRESSURE: 107 MMHG | DIASTOLIC BLOOD PRESSURE: 57 MMHG

## 2021-12-06 DIAGNOSIS — N20.1 RIGHT URETERAL STONE: Primary | ICD-10-CM

## 2021-12-06 PROCEDURE — 52352 CYSTOURETERO W/STONE REMOVE: CPT | Mod: RT | Performed by: UROLOGY

## 2021-12-06 PROCEDURE — 52332 CYSTOSCOPY AND TREATMENT: CPT | Mod: RT | Performed by: UROLOGY

## 2021-12-06 PROCEDURE — 999N000141 HC STATISTIC PRE-PROCEDURE NURSING ASSESSMENT: Performed by: UROLOGY

## 2021-12-06 PROCEDURE — 272N000001 HC OR GENERAL SUPPLY STERILE: Performed by: UROLOGY

## 2021-12-06 PROCEDURE — 250N000009 HC RX 250: Performed by: NURSE ANESTHETIST, CERTIFIED REGISTERED

## 2021-12-06 PROCEDURE — 999N000179 XR SURGERY CARM FLUORO LESS THAN 5 MIN W STILLS

## 2021-12-06 PROCEDURE — 710N000009 HC RECOVERY PHASE 1, LEVEL 1, PER MIN: Performed by: UROLOGY

## 2021-12-06 PROCEDURE — 250N000011 HC RX IP 250 OP 636: Performed by: NURSE ANESTHETIST, CERTIFIED REGISTERED

## 2021-12-06 PROCEDURE — 74420 UROGRAPHY RTRGR +-KUB: CPT | Mod: 26 | Performed by: UROLOGY

## 2021-12-06 PROCEDURE — 82365 CALCULUS SPECTROSCOPY: CPT | Performed by: UROLOGY

## 2021-12-06 PROCEDURE — 250N000011 HC RX IP 250 OP 636: Performed by: UROLOGY

## 2021-12-06 PROCEDURE — 258N000003 HC RX IP 258 OP 636: Performed by: NURSE ANESTHETIST, CERTIFIED REGISTERED

## 2021-12-06 PROCEDURE — 250N000025 HC SEVOFLURANE, PER MIN: Performed by: UROLOGY

## 2021-12-06 PROCEDURE — 370N000017 HC ANESTHESIA TECHNICAL FEE, PER MIN: Performed by: UROLOGY

## 2021-12-06 PROCEDURE — 710N000012 HC RECOVERY PHASE 2, PER MINUTE: Performed by: UROLOGY

## 2021-12-06 PROCEDURE — 250N000009 HC RX 250: Performed by: UROLOGY

## 2021-12-06 PROCEDURE — C1769 GUIDE WIRE: HCPCS | Performed by: UROLOGY

## 2021-12-06 PROCEDURE — 250N000013 HC RX MED GY IP 250 OP 250 PS 637: Performed by: UROLOGY

## 2021-12-06 PROCEDURE — 360N000076 HC SURGERY LEVEL 3, PER MIN: Performed by: UROLOGY

## 2021-12-06 PROCEDURE — C2617 STENT, NON-COR, TEM W/O DEL: HCPCS | Performed by: UROLOGY

## 2021-12-06 DEVICE — URETERAL STENT
Type: IMPLANTABLE DEVICE | Site: URETHRA | Status: FUNCTIONAL
Brand: POLARIS™ ULTRA

## 2021-12-06 RX ORDER — NITROFURANTOIN 25; 75 MG/1; MG/1
100 CAPSULE ORAL 2 TIMES DAILY
Qty: 8 CAPSULE | Refills: 0 | Status: SHIPPED | OUTPATIENT
Start: 2021-12-08 | End: 2021-12-12

## 2021-12-06 RX ORDER — CEFAZOLIN SODIUM 2 G/100ML
2 INJECTION, SOLUTION INTRAVENOUS SEE ADMIN INSTRUCTIONS
Status: DISCONTINUED | OUTPATIENT
Start: 2021-12-06 | End: 2021-12-06 | Stop reason: HOSPADM

## 2021-12-06 RX ORDER — LIDOCAINE HYDROCHLORIDE 20 MG/ML
INJECTION, SOLUTION INFILTRATION; PERINEURAL PRN
Status: DISCONTINUED | OUTPATIENT
Start: 2021-12-06 | End: 2021-12-06

## 2021-12-06 RX ORDER — KETOROLAC TROMETHAMINE 30 MG/ML
INJECTION, SOLUTION INTRAMUSCULAR; INTRAVENOUS PRN
Status: DISCONTINUED | OUTPATIENT
Start: 2021-12-06 | End: 2021-12-06

## 2021-12-06 RX ORDER — ATROPA BELLADONNA AND OPIUM 16.2; 3 MG/1; MG/1
SUPPOSITORY RECTAL PRN
Status: DISCONTINUED | OUTPATIENT
Start: 2021-12-06 | End: 2021-12-06 | Stop reason: HOSPADM

## 2021-12-06 RX ORDER — FENTANYL CITRATE 50 UG/ML
INJECTION, SOLUTION INTRAMUSCULAR; INTRAVENOUS PRN
Status: DISCONTINUED | OUTPATIENT
Start: 2021-12-06 | End: 2021-12-06

## 2021-12-06 RX ORDER — CEFAZOLIN SODIUM 2 G/100ML
2 INJECTION, SOLUTION INTRAVENOUS
Status: COMPLETED | OUTPATIENT
Start: 2021-12-06 | End: 2021-12-06

## 2021-12-06 RX ORDER — PROPOFOL 10 MG/ML
INJECTION, EMULSION INTRAVENOUS PRN
Status: DISCONTINUED | OUTPATIENT
Start: 2021-12-06 | End: 2021-12-06

## 2021-12-06 RX ORDER — TOLTERODINE TARTRATE 2 MG/1
2 TABLET, EXTENDED RELEASE ORAL ONCE
Status: COMPLETED | OUTPATIENT
Start: 2021-12-06 | End: 2021-12-06

## 2021-12-06 RX ORDER — DEXAMETHASONE SODIUM PHOSPHATE 4 MG/ML
INJECTION, SOLUTION INTRA-ARTICULAR; INTRALESIONAL; INTRAMUSCULAR; INTRAVENOUS; SOFT TISSUE PRN
Status: DISCONTINUED | OUTPATIENT
Start: 2021-12-06 | End: 2021-12-06

## 2021-12-06 RX ORDER — ONDANSETRON 2 MG/ML
INJECTION INTRAMUSCULAR; INTRAVENOUS PRN
Status: DISCONTINUED | OUTPATIENT
Start: 2021-12-06 | End: 2021-12-06

## 2021-12-06 RX ORDER — OXYBUTYNIN CHLORIDE 10 MG/1
10 TABLET, EXTENDED RELEASE ORAL DAILY PRN
Qty: 5 TABLET | Refills: 0 | Status: SHIPPED | OUTPATIENT
Start: 2021-12-06 | End: 2024-08-01

## 2021-12-06 RX ORDER — SODIUM CHLORIDE, SODIUM LACTATE, POTASSIUM CHLORIDE, CALCIUM CHLORIDE 600; 310; 30; 20 MG/100ML; MG/100ML; MG/100ML; MG/100ML
INJECTION, SOLUTION INTRAVENOUS CONTINUOUS PRN
Status: DISCONTINUED | OUTPATIENT
Start: 2021-12-06 | End: 2021-12-06

## 2021-12-06 RX ADMIN — LIDOCAINE HYDROCHLORIDE 60 MG: 20 INJECTION, SOLUTION INFILTRATION; PERINEURAL at 14:44

## 2021-12-06 RX ADMIN — FENTANYL CITRATE 50 MCG: 50 INJECTION, SOLUTION INTRAMUSCULAR; INTRAVENOUS at 14:56

## 2021-12-06 RX ADMIN — SODIUM CHLORIDE, POTASSIUM CHLORIDE, SODIUM LACTATE AND CALCIUM CHLORIDE: 600; 310; 30; 20 INJECTION, SOLUTION INTRAVENOUS at 14:24

## 2021-12-06 RX ADMIN — CEFAZOLIN SODIUM 2 G: 2 INJECTION, SOLUTION INTRAVENOUS at 14:41

## 2021-12-06 RX ADMIN — PROPOFOL 200 MG: 10 INJECTION, EMULSION INTRAVENOUS at 14:44

## 2021-12-06 RX ADMIN — KETOROLAC TROMETHAMINE 15 MG: 30 INJECTION, SOLUTION INTRAMUSCULAR at 14:59

## 2021-12-06 RX ADMIN — MIDAZOLAM 2 MG: 1 INJECTION INTRAMUSCULAR; INTRAVENOUS at 14:41

## 2021-12-06 RX ADMIN — TOLTERODINE TARTRATE 2 MG: 2 TABLET, FILM COATED ORAL at 16:14

## 2021-12-06 RX ADMIN — DEXAMETHASONE SODIUM PHOSPHATE 4 MG: 4 INJECTION, SOLUTION INTRA-ARTICULAR; INTRALESIONAL; INTRAMUSCULAR; INTRAVENOUS; SOFT TISSUE at 14:51

## 2021-12-06 RX ADMIN — FENTANYL CITRATE 50 MCG: 50 INJECTION, SOLUTION INTRAMUSCULAR; INTRAVENOUS at 14:44

## 2021-12-06 RX ADMIN — PROPOFOL 50 MG: 10 INJECTION, EMULSION INTRAVENOUS at 14:46

## 2021-12-06 RX ADMIN — ONDANSETRON 4 MG: 2 INJECTION INTRAMUSCULAR; INTRAVENOUS at 14:51

## 2021-12-06 ASSESSMENT — LIFESTYLE VARIABLES: TOBACCO_USE: 1

## 2021-12-06 ASSESSMENT — MIFFLIN-ST. JEOR: SCORE: 1236.42

## 2021-12-06 NOTE — ANESTHESIA POSTPROCEDURE EVALUATION
Patient: Narda Guerrero    Procedure: Procedure(s):  Cystoscopy, right retrograde pyelogram, right ureteroscopy, stone basket extraction, right stent placement       Diagnosis:Right ureteral stone [N20.1]  Diagnosis Additional Information: No value filed.    Anesthesia Type:  General    Note:  Disposition: Outpatient   Postop Pain Control: Uneventful            Sign Out: Well controlled pain   PONV: No   Neuro/Psych: Uneventful            Sign Out: Acceptable/Baseline neuro status   Airway/Respiratory: Uneventful            Sign Out: Acceptable/Baseline resp. status   CV/Hemodynamics: Uneventful            Sign Out: Acceptable CV status   Other NRE: NONE   DID A NON-ROUTINE EVENT OCCUR? No           Last vitals:  Vitals Value Taken Time   /57 12/06/21 1615   Temp 35.9  C (96.7  F) 12/06/21 1615   Pulse 75 12/06/21 1616   Resp 11 12/06/21 1618   SpO2 99 % 12/06/21 1618   Vitals shown include unvalidated device data.    Electronically Signed By: Marcel Hernández MD  December 6, 2021  4:45 PM

## 2021-12-06 NOTE — ANESTHESIA PROCEDURE NOTES
Airway       Patient location during procedure: OR  Staff -        Anesthesiologist:  Hector Faye MD       CRNA: Kari Traore APRN CRNA       Performed By: CRNA  Consent for Airway        Urgency: elective  Indications and Patient Condition       Indications for airway management: paula-procedural       Induction type:intravenous       Mask difficulty assessment: 1 - vent by mask    Final Airway Details       Final airway type: supraglottic airway    Supraglottic Airway Details        Type: LMA       Brand: Ambu AuraGain       LMA size: 4    Post intubation assessment        Placement verified by: capnometry, equal breath sounds and chest rise        Number of attempts at approach: 1       Secured with: pink tape       Ease of procedure: easy       Dentition: Intact and Unchanged

## 2021-12-06 NOTE — ANESTHESIA CARE TRANSFER NOTE
Patient: Narda Guerrero    Procedure: Procedure(s):  Cystoscopy, right retrograde pyelogram, right ureteroscopy, stone basket extraction, right stent placement       Diagnosis: Right ureteral stone [N20.1]  Diagnosis Additional Information: No value filed.    Anesthesia Type:   General     Note:    Oropharynx: oropharynx clear of all foreign objects  Level of Consciousness: awake  Oxygen Supplementation: face mask  Level of Supplemental Oxygen (L/min / FiO2): 4  Independent Airway: airway patency satisfactory and stable  Dentition: dentition unchanged  Vital Signs Stable: post-procedure vital signs reviewed and stable  Report to RN Given: handoff report given  Patient transferred to: PACU    Handoff Report: Identifed the Patient, Identified the Reponsible Provider, Reviewed the pertinent medical history, Discussed the surgical course, Reviewed Intra-OP anesthesia mangement and issues during anesthesia, Set expectations for post-procedure period and Allowed opportunity for questions and acknowledgement of understanding      Vitals:  Vitals Value Taken Time   BP     Temp     Pulse 90 12/06/21 1516   Resp 14 12/06/21 1516   SpO2 100 % 12/06/21 1516   Vitals shown include unvalidated device data.    Electronically Signed By: JIMENA Montanez CRNA  December 6, 2021  3:17 PM

## 2021-12-06 NOTE — ANESTHESIA PREPROCEDURE EVALUATION
Anesthesia Pre-Procedure Evaluation    Patient: Narda Guerrero   MRN: [id number] : 1970        Preoperative Diagnosis: Right ureteral stone [N20.1]    Procedure : Procedure(s):  Cystoscopy, right retrograde pyelogram, right ureteroscopy with laser lithotripsy and stone basket extraction, right stent placement          Past Medical History:   Diagnosis Date     Abnormal glandular Papanicolaou smear of cervix 21 yo    normal since LEEP     Depressive disorder, not elsewhere classified      Generalized anxiety disorder      Headache      Insomnia, unspecified      Major depressive disorder, single episode, severe, without mention of psychotic behavior 2010     Unspecified sinusitis (chronic)       Past Surgical History:   Procedure Laterality Date     COSMETIC SURGERY       D & C      for miscarriage     LEEP TX, CERVICAL  ~23yo    LEEP TX Cervical     ZZC NONSPECIFIC PROCEDURE      breast implants     ZZC NONSPECIFIC PROCEDURE  ,     2 NSVDs      Allergies   Allergen Reactions     Ortho Tri-Cyclen [Ortho Tri-Cyclen Lo] Nausea and Vomiting     Sulfa Drugs      Bilateral eye swelling.       Social History     Tobacco Use     Smoking status: Former Smoker     Packs/day: 0.00     Years: 10.00     Pack years: 0.00     Types: Cigarettes     Quit date: 1/10/2000     Years since quittin.9     Smokeless tobacco: Never Used   Substance Use Topics     Alcohol use: Yes     Comment: occasionally      Wt Readings from Last 1 Encounters:   21 59.5 kg (131 lb 1.6 oz)        Anesthesia Evaluation            ROS/MED HX  ENT/Pulmonary:     (+) tobacco use, Past use,  (-) sleep apnea   Neurologic:       Cardiovascular:       METS/Exercise Tolerance:     Hematologic:       Musculoskeletal:       GI/Hepatic: Comment: Dysphagia with certain foods   (-) GERD   Renal/Genitourinary:     (+) Nephrolithiasis ,     Endo:       Psychiatric/Substance Use:     (+) psychiatric history depression and  anxiety     Infectious Disease:       Malignancy:       Other:            Physical Exam    Airway        Mallampati: II   TM distance: > 3 FB   Neck ROM: full   Mouth opening: > 3 cm    Respiratory Devices and Support         Dental  no notable dental history         Cardiovascular   cardiovascular exam normal          Pulmonary   pulmonary exam normal                OUTSIDE LABS:  CBC:   Lab Results   Component Value Date    WBC 7.0 11/09/2021    WBC 6.7 11/16/2020    HGB 11.2 (L) 11/09/2021    HGB 14.2 11/16/2020    HCT 37.7 11/09/2021    HCT 43.6 11/16/2020     11/09/2021     11/16/2020     BMP:   Lab Results   Component Value Date     11/09/2021     11/16/2020    POTASSIUM 3.9 11/09/2021    POTASSIUM 4.1 11/16/2020    CHLORIDE 109 11/09/2021    CHLORIDE 105 11/16/2020    CO2 26 11/09/2021    CO2 28 11/16/2020    BUN 14 11/09/2021    BUN 13 11/16/2020    CR 0.79 11/09/2021    CR 0.80 11/16/2020     (H) 11/09/2021     (H) 11/16/2020     COAGS: No results found for: PTT, INR, FIBR  POC:   Lab Results   Component Value Date    HCG Negative  SPECIFIC GRAVITY = 1.025 02/11/2005     HEPATIC:   Lab Results   Component Value Date    ALBUMIN 3.4 11/09/2021    PROTTOTAL 7.1 11/09/2021    ALT 28 11/09/2021    AST 22 11/09/2021    ALKPHOS 84 11/09/2021    BILITOTAL 0.1 (L) 11/09/2021     OTHER:   Lab Results   Component Value Date    PADDY 8.1 (L) 11/09/2021    MAG 1.9 03/02/2009    LIPASE 128 11/09/2021    TSH 1.30 10/04/2018    SED 7 11/01/2010       Anesthesia Plan    ASA Status:  2   NPO Status:  NPO Appropriate    Anesthesia Type: General.     - Airway: LMA   Induction: Intravenous.   Maintenance: Inhalation.        Consents    Anesthesia Plan(s) and associated risks, benefits, and realistic alternatives discussed. Questions answered and patient/representative(s) expressed understanding.    - Discussed:     - Discussed with:  Patient         Postoperative Care    Pain management: IV  analgesics, Oral pain medications.   PONV prophylaxis: Ondansetron (or other 5HT-3), Dexamethasone or Solumedrol, Background Propofol Infusion     Comments:                Marcel Hernández MD

## 2021-12-06 NOTE — BRIEF OP NOTE
[facility] Brief Operative Note    Pre-operative diagnosis: Right ureteral stone [N20.1]   Post-operative diagnosis right ureteral stone   Procedure: Procedure(s):  Cystoscopy, right retrograde pyelogram, right ureteroscopy, stone basket extraction, right stent placement   Surgeon: Yosvany Garcia MD   Assistants(s): none   Estimated blood loss: Less than 10 ml    Specimens: Right ureteral stone   Findings: Stone in distal ureter removed via basket extraction  6 fr x 24 cm double J stent left on extraction string

## 2021-12-06 NOTE — DISCHARGE INSTRUCTIONS
Today you received Toradol, an antiinflammatory medication similar to Ibuprofen.  You should not take other antiinflammatory medication, such as Ibuprofen, Motrin, Advil, Aleve, Naprosyn, etc until 9pm.        Same Day Surgery Discharge Instructions for  Sedation and General Anesthesia       It's not unusual to feel dizzy, light-headed or faint for up to 24 hours after surgery or while taking pain medication.  If you have these symptoms: sit for a few minutes before standing and have someone assist you when you get up to walk or use the bathroom.      You should rest and relax for the next 24 hours. We recommend you make arrangements to have an adult stay with you for at least 24 hours after your discharge.  Avoid hazardous and strenuous activity.      DO NOT DRIVE any vehicle or operate mechanical equipment for 24 hours following the end of your surgery.  Even though you may feel normal, your reactions may be affected by the medication you have received.      Do not drink alcoholic beverages for 24 hours following surgery.       Slowly progress to your regular diet as you feel able. It's not unusual to feel nauseated and/or vomit after receiving anesthesia.  If you develop these symptoms, drink clear liquids (apple juice, ginger ale, broth, 7-up, etc. ) until you feel better.  If your nausea and vomiting persists for 24 hours, please notify your surgeon.        All narcotic pain medications, along with inactivity and anesthesia, can cause constipation. Drinking plenty of liquids and increasing fiber intake will help.      For any questions of a medical nature, call your surgeon.      Do not make important decisions for 24 hours.      If you had general anesthesia, you may have a sore throat for a couple of days related to the breathing tube used during surgery.  You may use Cepacol lozenges to help with this discomfort.  If it worsens or if you develop a fever, contact your surgeon.       If you feel your pain  is not well managed with the pain medications prescribed by your surgeon, please contact your surgeon's office to let them know so they can address your concerns.       CoVid 19 Information    We want to give you information regarding Covid. Please consult your primary care provider with any questions you might have.     Patient who have symptoms (cough, fever, or shortness of breath), need to isolate for 7 days from when symptoms started OR 72 hours after fever resolves (without fever reducing medications) AND improvement of respiratory symptoms (whichever is longer).      Isolate yourself at home (in own room/own bathroom if possible)    Do Not allow any visitors    Do Not go to work or school    Do Not go to [employer],  centers, shopping, or other public places.    Do Not shake hands.    Avoid close and intimate contact with others (hugging, kissing).    Follow CDC recommendations for household cleaning of frequently touched services.     After the initial 7 days, continue to isolate yourself from household members as much as possible. To continue decrease the risk of community spread and exposure, you and any members of your household should limit activities in public for 14 days after starting home isolation.     You can reference the following CDC link for helpful home isolation/care tips:  https://www.cdc.gov/coronavirus/2019-ncov/downloads/10Things.pdf    Protect Others:    Cover Your Mouth and Nose with a mask, disposable tissue or wash cloth to avoid spreading germs to others.    Wash your hands and face frequently with soap and water    Call Your Primary Doctor If: Breathing difficulty develops or you become worse.    For more information about COVID19 and options for caring for yourself at home, please visit the CDC website at https://www.cdc.gov/coronavirus/2019-ncov/about/steps-when-sick.html  For more options for care at [facility], please visit our website at  [email or web address]                Home-going instructions-----------------         Activity Limitation:     - No lifting restrictions  - No driving or operating heavy machinery while on narcotic pain medication.     FOLLOW THESE INSTRUCTIONS AS INDICATED BELOW:  - Observe operative area for signs of excessive bleeding.  - You may shower.  - Increase fluid intake to promote clear urine.  - Resume usual diet as tolerated    What to expect while recovering-----------  - You may experience some intermittent bleeding that makes your urine pink or cherry colored. This is normal.  - However, if you are unable to urinate, passing large amount of clots, have dandy blood in your urine, or have a temperature >101 degrees, call the urology nurse on call, or present to your nearest emergency department.  - You are encouraged to walk daily, and have no activity restrictions.   - A URETERAL STENT has been placed that allows urine to flow unobstructed from your kidney into your bladder.  The stent has a curl in the kidney and a curl in the bladder.  The curl in the bladder can cause some urgency and frequency of urination as well as some mild blood in the urine.  The curl in the kidney can cause some mild flank discomfort.  This may be more noticeable when you urinate.  A URETERAL STENT is meant to be left in temporarily.  It must be removed or changed no later than 3 months after it's insertion.  If it's not removed it can result in stone overgrowth on the stent that can cause pain, infection, and can be very difficult to remove.      You have a ureteral stent attached to a string coming out your urethra (the tube you urinate through).  This can be removed December 9th morning by pulling on the string until the stent is completely removed.  The stent is 11 inches long has two loops, and once you see both the stent is out.  Please contact us if any questions/concerns.    Discharge  Medications/instructions:     - Flomax (tamsulosin) to be taken daily as needed until stent is removed    - Start your antibiotic  (nitrofurantoin) the night before your stent is removed    - Take Tylenol 650 mg every 6 hours for pain    - Take diclofenac 50 mg every 8 hours as needed for additional pain control    - Take oxybutynin 10 mg every day as needed for bladder spasms/urinary urgency      Questions/concerns------------------------  [facility]: [phone]  Yosvany Garcia MD

## 2021-12-06 NOTE — INTERVAL H&P NOTE
I have reviewed the surgical (or preoperative) H&P that is linked to this encounter, and examined the patient. Noted changes include: dysphagia with certain foods

## 2021-12-07 ASSESSMENT — ENCOUNTER SYMPTOMS
BREAST MASS: 0
HEADACHES: 0
JOINT SWELLING: 0
COUGH: 0
SHORTNESS OF BREATH: 0
MYALGIAS: 0
NERVOUS/ANXIOUS: 0
FEVER: 0
CONSTIPATION: 0
EYE PAIN: 0
PALPITATIONS: 0
DIZZINESS: 0
ABDOMINAL PAIN: 0
ARTHRALGIAS: 0
FREQUENCY: 0
WEAKNESS: 0
SORE THROAT: 0
CHILLS: 0
DYSURIA: 0
HEARTBURN: 0
HEMATOCHEZIA: 0
NAUSEA: 0
DIARRHEA: 0
PARESTHESIAS: 0
HEMATURIA: 0

## 2021-12-07 ASSESSMENT — PATIENT HEALTH QUESTIONNAIRE - PHQ9
SUM OF ALL RESPONSES TO PHQ QUESTIONS 1-9: 17
SUM OF ALL RESPONSES TO PHQ QUESTIONS 1-9: 17
10. IF YOU CHECKED OFF ANY PROBLEMS, HOW DIFFICULT HAVE THESE PROBLEMS MADE IT FOR YOU TO DO YOUR WORK, TAKE CARE OF THINGS AT HOME, OR GET ALONG WITH OTHER PEOPLE: SOMEWHAT DIFFICULT

## 2021-12-08 ENCOUNTER — OFFICE VISIT (OUTPATIENT)
Dept: FAMILY MEDICINE | Facility: CLINIC | Age: 51
End: 2021-12-08
Payer: COMMERCIAL

## 2021-12-08 VITALS
HEART RATE: 92 BPM | SYSTOLIC BLOOD PRESSURE: 120 MMHG | TEMPERATURE: 98.4 F | OXYGEN SATURATION: 100 % | HEIGHT: 66 IN | RESPIRATION RATE: 16 BRPM | BODY MASS INDEX: 20.73 KG/M2 | WEIGHT: 129 LBS | DIASTOLIC BLOOD PRESSURE: 84 MMHG

## 2021-12-08 DIAGNOSIS — Z00.00 ROUTINE GENERAL MEDICAL EXAMINATION AT A HEALTH CARE FACILITY: Primary | ICD-10-CM

## 2021-12-08 DIAGNOSIS — R13.10 DYSPHAGIA, UNSPECIFIED TYPE: ICD-10-CM

## 2021-12-08 DIAGNOSIS — F13.20 SEDATIVE, HYPNOTIC OR ANXIOLYTIC DEPENDENCE (H): ICD-10-CM

## 2021-12-08 DIAGNOSIS — G47.00 PERSISTENT INSOMNIA: ICD-10-CM

## 2021-12-08 DIAGNOSIS — N20.0 KIDNEY STONE: ICD-10-CM

## 2021-12-08 DIAGNOSIS — F33.2 SEVERE EPISODE OF RECURRENT MAJOR DEPRESSIVE DISORDER, WITHOUT PSYCHOTIC FEATURES (H): ICD-10-CM

## 2021-12-08 DIAGNOSIS — B00.1 COLD SORE: ICD-10-CM

## 2021-12-08 DIAGNOSIS — N95.1 VAGINAL DRYNESS, MENOPAUSAL: ICD-10-CM

## 2021-12-08 LAB
ERYTHROCYTE [DISTWIDTH] IN BLOOD BY AUTOMATED COUNT: 13.3 % (ref 10–15)
HCT VFR BLD AUTO: 39.9 % (ref 35–47)
HGB BLD-MCNC: 12.1 G/DL (ref 11.7–15.7)
HOLD SPECIMEN: NORMAL
MCH RBC QN AUTO: 27.6 PG (ref 26.5–33)
MCHC RBC AUTO-ENTMCNC: 30.3 G/DL (ref 31.5–36.5)
MCV RBC AUTO: 91 FL (ref 78–100)
PLATELET # BLD AUTO: 301 10E3/UL (ref 150–450)
RBC # BLD AUTO: 4.39 10E6/UL (ref 3.8–5.2)
WBC # BLD AUTO: 6.5 10E3/UL (ref 4–11)

## 2021-12-08 PROCEDURE — 87624 HPV HI-RISK TYP POOLED RSLT: CPT | Performed by: FAMILY MEDICINE

## 2021-12-08 PROCEDURE — 36415 COLL VENOUS BLD VENIPUNCTURE: CPT | Performed by: FAMILY MEDICINE

## 2021-12-08 PROCEDURE — 90686 IIV4 VACC NO PRSV 0.5 ML IM: CPT | Performed by: FAMILY MEDICINE

## 2021-12-08 PROCEDURE — 85027 COMPLETE CBC AUTOMATED: CPT | Performed by: FAMILY MEDICINE

## 2021-12-08 PROCEDURE — 80061 LIPID PANEL: CPT | Performed by: FAMILY MEDICINE

## 2021-12-08 PROCEDURE — 99214 OFFICE O/P EST MOD 30 MIN: CPT | Mod: 25 | Performed by: FAMILY MEDICINE

## 2021-12-08 PROCEDURE — 99396 PREV VISIT EST AGE 40-64: CPT | Mod: 25 | Performed by: FAMILY MEDICINE

## 2021-12-08 PROCEDURE — 84443 ASSAY THYROID STIM HORMONE: CPT | Performed by: FAMILY MEDICINE

## 2021-12-08 PROCEDURE — 80053 COMPREHEN METABOLIC PANEL: CPT | Performed by: FAMILY MEDICINE

## 2021-12-08 PROCEDURE — 90471 IMMUNIZATION ADMIN: CPT | Performed by: FAMILY MEDICINE

## 2021-12-08 PROCEDURE — G0145 SCR C/V CYTO,THINLAYER,RESCR: HCPCS | Performed by: FAMILY MEDICINE

## 2021-12-08 RX ORDER — ESTRADIOL 10 UG/1
10 INSERT VAGINAL
Qty: 24 TABLET | Refills: 3 | Status: SHIPPED | OUTPATIENT
Start: 2021-12-09 | End: 2024-08-01

## 2021-12-08 RX ORDER — ZOLPIDEM TARTRATE 5 MG/1
5 TABLET ORAL AT BEDTIME
Qty: 30 TABLET | Refills: 5 | Status: SHIPPED | OUTPATIENT
Start: 2021-12-08 | End: 2024-08-01

## 2021-12-08 RX ORDER — ACYCLOVIR 400 MG/1
400 TABLET ORAL 3 TIMES DAILY
Qty: 15 TABLET | Refills: 3 | Status: SHIPPED | OUTPATIENT
Start: 2021-12-08 | End: 2024-08-01

## 2021-12-08 RX ORDER — CYCLOBENZAPRINE HCL 5 MG
5 TABLET ORAL 2 TIMES DAILY PRN
Qty: 90 TABLET | Refills: 3 | Status: SHIPPED | OUTPATIENT
Start: 2021-12-08 | End: 2022-09-30

## 2021-12-08 ASSESSMENT — ENCOUNTER SYMPTOMS
NERVOUS/ANXIOUS: 0
ARTHRALGIAS: 0
SHORTNESS OF BREATH: 0
PALPITATIONS: 0
JOINT SWELLING: 0
BREAST MASS: 0
FEVER: 0
EYE PAIN: 0
DIARRHEA: 0
FREQUENCY: 0
HEMATOCHEZIA: 0
DYSURIA: 0
HEARTBURN: 0
COUGH: 0
ABDOMINAL PAIN: 0
NAUSEA: 0
SORE THROAT: 0
HEADACHES: 0
MYALGIAS: 0
DIZZINESS: 0
PARESTHESIAS: 0
CHILLS: 0
WEAKNESS: 0
CONSTIPATION: 0
HEMATURIA: 0

## 2021-12-08 ASSESSMENT — PATIENT HEALTH QUESTIONNAIRE - PHQ9: SUM OF ALL RESPONSES TO PHQ QUESTIONS 1-9: 17

## 2021-12-08 ASSESSMENT — MIFFLIN-ST. JEOR: SCORE: 1222.92

## 2021-12-08 ASSESSMENT — PAIN SCALES - GENERAL: PAINLEVEL: SEVERE PAIN (6)
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
## 2021-12-09 LAB
ALBUMIN SERPL-MCNC: 3.4 G/DL (ref 3.4–5)
ALP SERPL-CCNC: 96 U/L (ref 40–150)
ALT SERPL W P-5'-P-CCNC: 17 U/L (ref 0–50)
ANION GAP SERPL CALCULATED.3IONS-SCNC: 4 MMOL/L (ref 3–14)
AST SERPL W P-5'-P-CCNC: 11 U/L (ref 0–45)
BILIRUB SERPL-MCNC: 0.2 MG/DL (ref 0.2–1.3)
BUN SERPL-MCNC: 10 MG/DL (ref 7–30)
CALCIUM SERPL-MCNC: 8.5 MG/DL (ref 8.5–10.1)
CHLORIDE BLD-SCNC: 107 MMOL/L (ref 94–109)
CO2 SERPL-SCNC: 28 MMOL/L (ref 20–32)
CREAT SERPL-MCNC: 0.8 MG/DL (ref 0.52–1.04)
GFR SERPL CREATININE-BSD FRML MDRD: 87 ML/MIN/1.73M2
GLUCOSE BLD-MCNC: 97 MG/DL (ref 70–99)
POTASSIUM BLD-SCNC: 3.4 MMOL/L (ref 3.4–5.3)
PROT SERPL-MCNC: 7.2 G/DL (ref 6.8–8.8)
SODIUM SERPL-SCNC: 139 MMOL/L (ref 133–144)
TSH SERPL DL<=0.005 MIU/L-ACNC: 1.7 MU/L (ref 0.4–4)

## 2021-12-10 LAB
APPEARANCE STONE: NORMAL
COMPN STONE: NORMAL
SPECIMEN WT: 109 MG

## 2021-12-12 NOTE — OP NOTE
OPERATIVE REPORT    PREOPERATIVE DIAGNOSIS:  Right distal ureteral calculus    POSTOPERATIVE DIAGNOSIS: Same    PROCEDURES PERFORMED:   Cystoscopy  Right ureteroscopy with basket extraction of a distal ureteral stone  Right retrograde pyelogram  Right ureteral stent placement    STAFF SURGEON: Yosvany Garcia MD  RESIDENT(S): None    ANESTHESIA: General  ESTIMATED BLOOD LOSS: Less than 10 ml  COMPLICATIONS: None.   SPECIMEN: Right ureteral stone    SIGNIFICANT FINDINGS:   No bladder abnormality  7 mm distal ureteral stone removed via basket extraction  6 Iraqi by 24 cm double-J ureteral stent left on string      BRIEF OPERATIVE INDICATIONS: Narda Guerrero is a(n) 51 year old female who presented with a right distal ureteral stone on November 9 and on repeat imaging November 29 the stone had not passed despite tamsulosin medical therapy..  After a discussion of all risks, benefits, and alternatives, the patient elected to proceed with ureteroscopic management of the stone.    DESCRIPTION OF PROCEDURE:  After informed consent was obtained, the patient was transported to the operating room & placed supine on the table. After adequate anesthesia was induced, the patient was placed in lithotomy and prepped and draped in the usual sterile fashion. A timeout was taken to confirm correct patient, procedure and laterality. Pre-operative IV antibiotics were administered.     A 22 Iraqi cystoscope was inserted through the urethra into the bladder and there were no abnormalities of the urethra or the bladder.  Ureteral orifices were in orthotopic position bilaterally and a 0.035 sensor tip guidewire was inserted into the ureter with the assistance of a 10 Iraqi dual-lumen catheter as there is some resistance likely from the stone.  The right ureteral orifice was then slightly dilated with a 10 Iraqi dual-lumen catheter after confirming the wire placement into the collecting system.  The bladder was drained and the  cystoscope was removed.    The wire was clipped to the drape as a safety wire and the semirigid ureteroscope was advanced through the UO into the distal ureter where the stone was seen.  There is mild irritation around the area of the stone.  The stone was able to be grasped with a 0 tip 1.9 Central African nitinol basket and removed in 1 piece without significant pulling on the ureter.    The scope was then readvanced to examine the ureter which was with minor mucosal scuffing but no injury.  Retrograde pyelogram was performed which demonstrated no hydronephrosis.    A 6 Central African by 24 cm double-J ureteral stent was placed over the safety wire with good proximal and distal curl.  The string was into a single string from the original loop in order to prevent premature removal of the stent.    They were awakened from anesthesia and transferred to the PACU.       POSTOP PLAN:  Patient to pull stent on December 9  6-week KUB and ultrasound  Follow-up visit after

## 2021-12-13 ENCOUNTER — LAB (OUTPATIENT)
Dept: LAB | Facility: CLINIC | Age: 51
End: 2021-12-13

## 2021-12-13 ENCOUNTER — ANCILLARY PROCEDURE (OUTPATIENT)
Dept: MAMMOGRAPHY | Facility: CLINIC | Age: 51
End: 2021-12-13
Attending: FAMILY MEDICINE
Payer: COMMERCIAL

## 2021-12-13 ENCOUNTER — TELEPHONE (OUTPATIENT)
Dept: UROLOGY | Facility: CLINIC | Age: 51
End: 2021-12-13

## 2021-12-13 DIAGNOSIS — R30.0 DYSURIA: ICD-10-CM

## 2021-12-13 DIAGNOSIS — Z00.00 ROUTINE GENERAL MEDICAL EXAMINATION AT A HEALTH CARE FACILITY: ICD-10-CM

## 2021-12-13 DIAGNOSIS — R30.0 DYSURIA: Primary | ICD-10-CM

## 2021-12-13 LAB
ALBUMIN UR-MCNC: NEGATIVE MG/DL
APPEARANCE UR: CLEAR
BILIRUB UR QL STRIP: NEGATIVE
BKR LAB AP GYN ADEQUACY: NORMAL
BKR LAB AP GYN INTERPRETATION: NORMAL
BKR LAB AP HPV REFLEX: NORMAL
BKR LAB AP PREVIOUS ABNORMAL: NORMAL
COLOR UR AUTO: YELLOW
GLUCOSE UR STRIP-MCNC: NEGATIVE MG/DL
HGB UR QL STRIP: ABNORMAL
KETONES UR STRIP-MCNC: NEGATIVE MG/DL
LEUKOCYTE ESTERASE UR QL STRIP: NEGATIVE
NITRATE UR QL: NEGATIVE
PATH REPORT.COMMENTS IMP SPEC: NORMAL
PATH REPORT.COMMENTS IMP SPEC: NORMAL
PATH REPORT.RELEVANT HX SPEC: NORMAL
PH UR STRIP: 5.5 [PH] (ref 5–7)
SP GR UR STRIP: 1.01 (ref 1–1.03)
UROBILINOGEN UR STRIP-ACNC: 0.2 E.U./DL

## 2021-12-13 PROCEDURE — 81003 URINALYSIS AUTO W/O SCOPE: CPT | Mod: QW

## 2021-12-13 PROCEDURE — 77067 SCR MAMMO BI INCL CAD: CPT | Mod: TC | Performed by: RADIOLOGY

## 2021-12-13 PROCEDURE — 87086 URINE CULTURE/COLONY COUNT: CPT

## 2021-12-13 NOTE — TELEPHONE ENCOUNTER
Returned phone call and spoke with patient. She reports that she has removed the stent a few days ago and forgot the antibiotic one day, but took it later in the evening. She is still having UTI symptoms, frequency and burning with urination. Instructed patient to leave a UAUC at another [facility] lab. She will call that lab to set-up an appointment. Patient is aware results may take 2-3 days and we will call or send [employer] message with results. Patient was recommended to pick-up AZO Plus OTC to help with symptoms in the mean time.     Kaylynn Persaud LPN

## 2021-12-13 NOTE — TELEPHONE ENCOUNTER
[facility] Call Center    Phone Message    May a detailed message be left on voicemail: yes     Reason for Call: Symptoms or Concerns     If patient has red-flag symptoms, warm transfer to triage line    Current symptom or concern: UTI symptoms, burning, uncomfortable urination, frequent urination    Symptoms have been present for: 1 week    Has patient previously been seen for this? NO    Are there any new or worsening symptoms? Yes: New      Action Taken: Other: uro    Travel Screening: Not Applicable                                                                         Notified Nurse

## 2021-12-14 LAB — BACTERIA UR CULT: NO GROWTH

## 2021-12-15 LAB
HUMAN PAPILLOMA VIRUS 16 DNA: NEGATIVE
HUMAN PAPILLOMA VIRUS 18 DNA: NEGATIVE
HUMAN PAPILLOMA VIRUS FINAL DIAGNOSIS: NORMAL
HUMAN PAPILLOMA VIRUS OTHER HR: NEGATIVE

## 2021-12-16 ENCOUNTER — PATIENT OUTREACH (OUTPATIENT)
Dept: FAMILY MEDICINE | Facility: CLINIC | Age: 51
End: 2021-12-16
Payer: COMMERCIAL

## 2021-12-16 LAB
CHOLEST SERPL-MCNC: 204 MG/DL
HDLC SERPL-MCNC: 66 MG/DL
LDLC SERPL CALC-MCNC: 109 MG/DL
NONHDLC SERPL-MCNC: 138 MG/DL
TRIGL SERPL-MCNC: 145 MG/DL

## 2022-01-11 ENCOUNTER — TELEPHONE (OUTPATIENT)
Dept: FAMILY MEDICINE | Facility: CLINIC | Age: 52
End: 2022-01-11
Payer: COMMERCIAL

## 2022-01-11 NOTE — TELEPHONE ENCOUNTER
Called the pt.  She started getting sick yesterday.  She says she usually always gets the flu once a year.  She did get the flu shot.  She just wants tamiflu prescribed.  Advised she would need some kind of visit - could do an e-visit.  We have not prescribed tamiflu since 2012.      She is concerned that she is going to test positive for covid since she had that not that long ago - she says November.        She has a h/a, cough, sore throat.  She is going on a cruise on 1/30/2022 so she wanted to make sure she was ok.        She is [facility].  She had covid in November.      Advised she could do an e-visit as she wants the flu,covid test and is wanting tamiflu.  Advised even if she does do an e-visit that I cannot guarantee tamiflu would be ordered and that it does only shorten the flu by 1 day.  She does not want to do an e-visit.      She wishes she could go to [facility] and get it done, but they are out 4 days.  Advised that unfortunately our testing is out a little ways also due to the high volumes of covid.      For now, she is going to keep her appt with the [facility] for a covid test.

## 2022-01-11 NOTE — TELEPHONE ENCOUNTER
Reason for Call:  Other call back    Detailed comments: PT HAS A HISTORY OF GETTING FLU, BADLY, ANNUALLY, SHE TAKES TAMAFLU AT [facility] AND STARTED HER USUAL SYMPTOMS YESTERDAY. PT HAVING TROUBLE WITH EVISIT AND THE SOONEST APPT SHE COULD GET FOR A FLU TEST, IF THERE WAS AN ORDER IS SATURDAY, SHE FEARS THAT WOULD BE TOO LATE TO STOP THE STRONGER SYMPTOMS. PT DID HAVE COVID IN NOV AND HAS NOT BEEN TESTED SINCE. PT WOULD LIKE TO GET TAMAFLU WITHOUT TEST IF POSSIBLE, AND WOULD BE OK IF COVID TEST WAS ORDERED TOO. PT WOULD LIKE CALL BACK    Phone Number Patient can be reached at: Home number on file [phone] (home)    Best Time: ANYTIME    Can we leave a detailed message on this number? YES    Call taken on 1/11/2022 at 1:27 PM by Gregoria Duenas

## 2022-02-09 ENCOUNTER — TELEPHONE (OUTPATIENT)
Dept: BEHAVIORAL HEALTH | Facility: HOSPITAL | Age: 52
End: 2022-02-09
Payer: COMMERCIAL

## 2022-02-09 NOTE — TELEPHONE ENCOUNTER
Attempted to reach patient on both phone numbers provided, no VM set up, no answer. Unable to confirm appointment on 2/11/22.  Maira Ocampo CMA on 2/9/2022 at 11:04 AM

## 2022-02-10 ASSESSMENT — ANXIETY QUESTIONNAIRES
7. FEELING AFRAID AS IF SOMETHING AWFUL MIGHT HAPPEN: SEVERAL DAYS
5. BEING SO RESTLESS THAT IT IS HARD TO SIT STILL: MORE THAN HALF THE DAYS
2. NOT BEING ABLE TO STOP OR CONTROL WORRYING: NEARLY EVERY DAY
GAD7 TOTAL SCORE: 15
1. FEELING NERVOUS, ANXIOUS, OR ON EDGE: NEARLY EVERY DAY
6. BECOMING EASILY ANNOYED OR IRRITABLE: SEVERAL DAYS
3. WORRYING TOO MUCH ABOUT DIFFERENT THINGS: NEARLY EVERY DAY
GAD7 TOTAL SCORE: 15
4. TROUBLE RELAXING: MORE THAN HALF THE DAYS
GAD7 TOTAL SCORE: 15
7. FEELING AFRAID AS IF SOMETHING AWFUL MIGHT HAPPEN: SEVERAL DAYS

## 2022-02-10 ASSESSMENT — PATIENT HEALTH QUESTIONNAIRE - PHQ9
SUM OF ALL RESPONSES TO PHQ QUESTIONS 1-9: 14
10. IF YOU CHECKED OFF ANY PROBLEMS, HOW DIFFICULT HAVE THESE PROBLEMS MADE IT FOR YOU TO DO YOUR WORK, TAKE CARE OF THINGS AT HOME, OR GET ALONG WITH OTHER PEOPLE: SOMEWHAT DIFFICULT
SUM OF ALL RESPONSES TO PHQ QUESTIONS 1-9: 14

## 2022-02-11 ENCOUNTER — VIRTUAL VISIT (OUTPATIENT)
Dept: PSYCHOLOGY | Facility: CLINIC | Age: 52
End: 2022-02-11
Attending: FAMILY MEDICINE
Payer: COMMERCIAL

## 2022-02-11 DIAGNOSIS — F33.2 SEVERE EPISODE OF RECURRENT MAJOR DEPRESSIVE DISORDER, WITHOUT PSYCHOTIC FEATURES (H): ICD-10-CM

## 2022-02-11 ASSESSMENT — ANXIETY QUESTIONNAIRES: GAD7 TOTAL SCORE: 15

## 2022-02-11 ASSESSMENT — PATIENT HEALTH QUESTIONNAIRE - PHQ9: SUM OF ALL RESPONSES TO PHQ QUESTIONS 1-9: 14

## 2022-02-11 NOTE — PROGRESS NOTES
Answers for HPI/ROS submitted by the patient on 2/10/2022  If you checked off any problems, how difficult have these problems made it for you to do your work, take care of things at home, or get along with other people?: Somewhat difficult  PHQ9 TOTAL SCORE: 14  SUPRIYA 7 TOTAL SCORE: 15      Ms. Guerrero was inappropriately referred for a General Psychological Eval when she was seeking an assessment for Autism Spectrum Disorder. This writer will provide a list of outside agencies.

## 2022-09-29 DIAGNOSIS — G47.00 PERSISTENT INSOMNIA: ICD-10-CM

## 2022-09-30 RX ORDER — CYCLOBENZAPRINE HCL 5 MG
5 TABLET ORAL 2 TIMES DAILY PRN
Qty: 90 TABLET | Refills: 3 | Status: SHIPPED | OUTPATIENT
Start: 2022-09-30 | End: 2023-03-24

## 2022-09-30 NOTE — TELEPHONE ENCOUNTER
Routing refill request to provider for review/approval because:  Drug not on the [facility] refill protocol   Izabel Steiner RN on 9/30/2022 at 11:59 AM

## 2022-11-28 ENCOUNTER — PATIENT OUTREACH (OUTPATIENT)
Dept: FAMILY MEDICINE | Facility: CLINIC | Age: 52
End: 2022-11-28

## 2022-11-28 DIAGNOSIS — Z98.890 S/P LEEP: ICD-10-CM

## 2023-03-24 RX ORDER — CX-024414 0.2 MG/ML
INJECTION, SUSPENSION INTRAMUSCULAR
COMMUNITY
Start: 2022-05-28 | End: 2024-09-04

## 2023-03-26 ENCOUNTER — HEALTH MAINTENANCE LETTER (OUTPATIENT)
Age: 53
End: 2023-03-26

## 2023-03-29 NOTE — TELEPHONE ENCOUNTER
Dr. Bob,    Patient is lost to pap tracking follow-up. Attempts to contact pt have been made per reminder process and there has been no reply and/or no appt scheduled.  If you are wanting any additional contact attempts please send to your care team staff.     Shelia Cooley RN  Pap Tracking     S/P LEEP  2/28/2006 NIL pap  3/2/2009 NIL pap  2/21/2012 NIL pap  7/28/2015 NIL pap  10/4/2018 NIL pap, Neg HPV  12/8/2021 NIL pap, Neg HPV. Per updated guidelines, plan cotest in 1 year  12/23/2021 Mychart not read, result letter sent by mail  11/28/2022 Reminder MyChart  12/28/2022 Reminder call - spoke to pt. Pt aware she is due for an appointment. Pt won't be back to [place] until March.   3/29/2023 Lost to follow-up for pap tracking

## 2024-03-17 ENCOUNTER — HEALTH MAINTENANCE LETTER (OUTPATIENT)
Age: 54
End: 2024-03-17

## 2024-05-26 ENCOUNTER — HEALTH MAINTENANCE LETTER (OUTPATIENT)
Age: 54
End: 2024-05-26

## 2024-08-01 ENCOUNTER — VIRTUAL VISIT (OUTPATIENT)
Dept: FAMILY MEDICINE | Facility: CLINIC | Age: 54
End: 2024-08-01
Payer: COMMERCIAL

## 2024-08-01 DIAGNOSIS — G47.00 PERSISTENT INSOMNIA: Primary | ICD-10-CM

## 2024-08-01 PROCEDURE — 99214 OFFICE O/P EST MOD 30 MIN: CPT | Mod: 95 | Performed by: GENERAL PRACTICE

## 2024-08-01 PROCEDURE — G2211 COMPLEX E/M VISIT ADD ON: HCPCS | Mod: 95 | Performed by: GENERAL PRACTICE

## 2024-08-01 RX ORDER — CYCLOBENZAPRINE HCL 5 MG
5 TABLET ORAL
Qty: 90 TABLET | Refills: 0 | Status: SHIPPED | OUTPATIENT
Start: 2024-08-01 | End: 2024-09-04

## 2024-08-01 RX ORDER — ZOLPIDEM TARTRATE 5 MG/1
5 TABLET ORAL AT BEDTIME
Qty: 30 TABLET | Refills: 1 | Status: SHIPPED | OUTPATIENT
Start: 2024-08-01 | End: 2024-09-04

## 2024-08-01 RX ORDER — CYCLOBENZAPRINE HCL 5 MG
5 TABLET ORAL
Qty: 90 TABLET | Refills: 3 | Status: CANCELLED | OUTPATIENT
Start: 2024-08-01

## 2024-08-01 RX ORDER — ZOLPIDEM TARTRATE 5 MG/1
5 TABLET ORAL AT BEDTIME
Qty: 30 TABLET | Refills: 5 | Status: CANCELLED | OUTPATIENT
Start: 2024-08-01

## 2024-08-01 ASSESSMENT — PATIENT HEALTH QUESTIONNAIRE - PHQ9
SUM OF ALL RESPONSES TO PHQ QUESTIONS 1-9: 7
SUM OF ALL RESPONSES TO PHQ QUESTIONS 1-9: 7
10. IF YOU CHECKED OFF ANY PROBLEMS, HOW DIFFICULT HAVE THESE PROBLEMS MADE IT FOR YOU TO DO YOUR WORK, TAKE CARE OF THINGS AT HOME, OR GET ALONG WITH OTHER PEOPLE: NOT DIFFICULT AT ALL

## 2024-08-01 NOTE — PROGRESS NOTES
Narda is a 54 year old who is being evaluated via a billable video visit.    How would you like to obtain your AVS? MyChart  If the video visit is dropped, the invitation should be resent by: Text to cell phone: [phone]  Will anyone else be joining your video visit? No      Assessment & Plan     Persistent insomnia  Chronic insomnia  Tried to be off ambien and has not worked  Discussed side effects  Evaluated for sleep apnea in the past  Consider a sleep specialist next visit    Joint issue  Takes flexeril to prevent joint popping               Subjective   Narda is a 54 year old, presenting for the following health issues:  Refill Request (Ambian and flexaril)      8/1/2024     3:09 PM   Additional Questions   Roomed by Miriam HASTINGS       Unable to sleep   Tried to go off ambien for awhile   Takes hours to fall asleep and this has been going on since a teenager  Has extensive travel plan coming up    Takes flexeril for sternoclavicular joint/it is popped out.      History of Present Illness       Reason for visit:  Need ambien and flexeril    She eats 4 or more servings of fruits and vegetables daily.She consumes 0 sweetened beverage(s) daily.She exercises with enough effort to increase her heart rate 30 to 60 minutes per day.  She exercises with enough effort to increase her heart rate 7 days per week.   She is taking medications regularly.               Review of Systems  Constitutional, HEENT, cardiovascular, pulmonary, gi and gu systems are negative, except as otherwise noted.      Objective           Vitals:  No vitals were obtained today due to virtual visit.    Physical Exam   GENERAL: alert and no distress  EYES: Eyes grossly normal to inspection.  No discharge or erythema, or obvious scleral/conjunctival abnormalities.  RESP: No audible wheeze, cough, or visible cyanosis.    SKIN: Visible skin clear. No significant rash, abnormal pigmentation or lesions.  NEURO: Cranial nerves grossly intact.  Mentation and  speech appropriate for age.  PSYCH: Appropriate affect, tone, and pace of words          Video-Visit Details    Type of service:  Video Visit   Originating Location (pt. Location): Home    Distant Location (provider location):  On-site  Platform used for Video Visit: Other: Haiku  Signed Electronically by: Brenda Walden MD

## 2024-09-03 ASSESSMENT — PATIENT HEALTH QUESTIONNAIRE - PHQ9
SUM OF ALL RESPONSES TO PHQ QUESTIONS 1-9: 8
10. IF YOU CHECKED OFF ANY PROBLEMS, HOW DIFFICULT HAVE THESE PROBLEMS MADE IT FOR YOU TO DO YOUR WORK, TAKE CARE OF THINGS AT HOME, OR GET ALONG WITH OTHER PEOPLE: NOT DIFFICULT AT ALL
SUM OF ALL RESPONSES TO PHQ QUESTIONS 1-9: 8

## 2024-09-04 ENCOUNTER — OFFICE VISIT (OUTPATIENT)
Dept: FAMILY MEDICINE | Facility: CLINIC | Age: 54
End: 2024-09-04
Payer: COMMERCIAL

## 2024-09-04 VITALS
BODY MASS INDEX: 22.9 KG/M2 | WEIGHT: 142.5 LBS | RESPIRATION RATE: 15 BRPM | DIASTOLIC BLOOD PRESSURE: 83 MMHG | TEMPERATURE: 98.3 F | SYSTOLIC BLOOD PRESSURE: 124 MMHG | HEIGHT: 66 IN | OXYGEN SATURATION: 99 % | HEART RATE: 83 BPM

## 2024-09-04 DIAGNOSIS — G47.00 PERSISTENT INSOMNIA: ICD-10-CM

## 2024-09-04 DIAGNOSIS — Z11.3 SCREEN FOR STD (SEXUALLY TRANSMITTED DISEASE): ICD-10-CM

## 2024-09-04 DIAGNOSIS — Z00.00 ROUTINE GENERAL MEDICAL EXAMINATION AT A HEALTH CARE FACILITY: Primary | ICD-10-CM

## 2024-09-04 DIAGNOSIS — E78.5 HYPERLIPIDEMIA WITH TARGET LDL LESS THAN 100: ICD-10-CM

## 2024-09-04 DIAGNOSIS — Z12.31 VISIT FOR SCREENING MAMMOGRAM: ICD-10-CM

## 2024-09-04 DIAGNOSIS — Z12.11 SCREEN FOR COLON CANCER: ICD-10-CM

## 2024-09-04 DIAGNOSIS — S43.61XD SPRAIN OF RIGHT STERNOCLAVICULAR JOINT, SUBSEQUENT ENCOUNTER: ICD-10-CM

## 2024-09-04 DIAGNOSIS — Z12.4 CERVICAL CANCER SCREENING: ICD-10-CM

## 2024-09-04 LAB
CLUE CELLS: PRESENT
TRICHOMONAS, WET PREP: ABNORMAL
WBC'S/HIGH POWER FIELD, WET PREP: ABNORMAL
YEAST, WET PREP: ABNORMAL

## 2024-09-04 PROCEDURE — 87210 SMEAR WET MOUNT SALINE/INK: CPT | Performed by: GENERAL PRACTICE

## 2024-09-04 PROCEDURE — 87389 HIV-1 AG W/HIV-1&-2 AB AG IA: CPT | Performed by: GENERAL PRACTICE

## 2024-09-04 PROCEDURE — 99214 OFFICE O/P EST MOD 30 MIN: CPT | Mod: 25 | Performed by: GENERAL PRACTICE

## 2024-09-04 PROCEDURE — 86803 HEPATITIS C AB TEST: CPT | Performed by: GENERAL PRACTICE

## 2024-09-04 PROCEDURE — 36415 COLL VENOUS BLD VENIPUNCTURE: CPT | Performed by: GENERAL PRACTICE

## 2024-09-04 PROCEDURE — 87624 HPV HI-RISK TYP POOLED RSLT: CPT | Performed by: GENERAL PRACTICE

## 2024-09-04 PROCEDURE — 83721 ASSAY OF BLOOD LIPOPROTEIN: CPT | Performed by: GENERAL PRACTICE

## 2024-09-04 PROCEDURE — 87491 CHLMYD TRACH DNA AMP PROBE: CPT | Performed by: GENERAL PRACTICE

## 2024-09-04 PROCEDURE — 80053 COMPREHEN METABOLIC PANEL: CPT | Performed by: GENERAL PRACTICE

## 2024-09-04 PROCEDURE — 99396 PREV VISIT EST AGE 40-64: CPT | Performed by: GENERAL PRACTICE

## 2024-09-04 PROCEDURE — 80061 LIPID PANEL: CPT | Performed by: GENERAL PRACTICE

## 2024-09-04 PROCEDURE — G0145 SCR C/V CYTO,THINLAYER,RESCR: HCPCS | Performed by: GENERAL PRACTICE

## 2024-09-04 PROCEDURE — 87591 N.GONORRHOEAE DNA AMP PROB: CPT | Performed by: GENERAL PRACTICE

## 2024-09-04 RX ORDER — ZOLPIDEM TARTRATE 5 MG/1
5 TABLET ORAL AT BEDTIME
Qty: 30 TABLET | Refills: 5 | Status: SHIPPED | OUTPATIENT
Start: 2024-09-04

## 2024-09-04 RX ORDER — CYCLOBENZAPRINE HCL 5 MG
5 TABLET ORAL
Qty: 90 TABLET | Refills: 1 | Status: SHIPPED | OUTPATIENT
Start: 2024-09-04

## 2024-09-04 ASSESSMENT — PAIN SCALES - GENERAL: PAINLEVEL: MODERATE PAIN (4)

## 2024-09-04 NOTE — PROGRESS NOTES
Preventive Care Visit  [facility] AIME Walden MD, Internal Medicine  Sep 4, 2024      Assessment & Plan     Routine general medical examination at a health care facility  Due for pap  Due for mammogram  Due for CRC  Defer vaccines today due to an up coming road trip    - Comprehensive metabolic panel (BMP + Alb, Alk Phos, ALT, AST, Total. Bili, TP); Future  - HIV Antigen Antibody Combo; Future  - Hepatitis C Screen Reflex to HCV RNA Quant and Genotype; Future  - Chlamydia & Gonorrhea by PCR, [facility] - Clinic Collect  - Comprehensive metabolic panel (BMP + Alb, Alk Phos, ALT, AST, Total. Bili, TP)  - HIV Antigen Antibody Combo  - Hepatitis C Screen Reflex to HCV RNA Quant and Genotype    Screen for colon cancer  Due  - Colonoscopy Screening  Referral; Future    Cervical cancer screening  Due  - HPV and Gynecologic Cytology Panel - Recommended Age 30 - 65 Years    Visit for screening mammogram  Due  - MA Screening Bilateral w/ Suhas; Future    Hyperlipidemia with target LDL less than 100    - Lipid panel reflex to direct LDL Fasting; Future  - Lipid panel reflex to direct LDL Fasting    Sprain of right sternoclavicular joint, subsequent encounter   See HPI  Refill flexeril  Saw MELODY many years ago and does not want to follow-up with ortho    Persistent insomnia  See HPI  Discussed with patient about seeing a sleep specialist to ensure we are not missing any causes to her insomnia.  Reviewed sleep study 2005  Saw Dr. Ward Maldonado in the past for sleep disorder  - Adult Sleep Eval & Management Referral; Future  - cyclobenzaprine (FLEXERIL) 5 MG tablet; Take 1 tablet (5 mg) by mouth nightly as needed for muscle spasms.  - zolpidem (AMBIEN) 5 MG tablet; Take 1 tablet (5 mg) by mouth at bedtime.    Follow-up in 6 months for refill    Screen for STD (sexually transmitted disease)  Interested in screening for STDs as patient is in a polygamous relationship  - Wet prep - Clinic  Collect    Patient has been advised of split billing requirements and indicates understanding: Yes        Counseling  Appropriate preventive services were addressed with this patient via screening, questionnaire, or discussion as appropriate for fall prevention, nutrition, physical activity, Tobacco-use cessation, social engagement, weight loss and cognition.  Checklist reviewing preventive services available has been given to the patient.  Reviewed patient's diet, addressing concerns and/or questions.   The patient's PHQ-9 score is consistent with mild depression. She was provided with information regarding depression.           Violet Laboy is a 54 year old, presenting for the following:  Physical        9/4/2024    10:54 AM   Additional Questions   Roomed by Kamala GIORDANO LPN        Health Care Directive  Patient does not have a Health Care Directive or Living Will: Discussed advance care planning with patient; however, patient declined at this time.        Physical Exam    Patient has not been getting he routine care due to her sister death.     Sternoclavicular joint popping, saw TRILUKASZ a long time ago  Very active   Wants to continue flexeril as it works, does not want a referral at this time  Sees a massage therapist every 2 weeks for relieving knots  Refill flexeril    Insomnia  Runs in the family  Discussed with patient about seeing a sleep specialist. She is worried about finance.  She is worried about not sleeping and worsening her depression.   Son is autistic/ADHD and patient is working on this.   Refill ambien                    9/3/2024   General Health   How would you rate your overall physical health? Good   Feel stress (tense, anxious, or unable to sleep) To some extent      (!) STRESS CONCERN      9/3/2024   Nutrition   Three or more servings of calcium each day? Yes   Diet: Regular (no restrictions)   How many servings of fruit and vegetables per day? 4 or more   How many sweetened beverages each  day? 0-1            9/3/2024   Exercise   Days per week of moderate/strenous exercise 6 days   Average minutes spent exercising at this level 60 min            9/3/2024   Social Factors   Frequency of gathering with friends or relatives Twice a week   Worry food won't last until get money to buy more No   Food not last or not have enough money for food? No   Do you have housing? (Housing is defined as stable permanent housing and does not include staying ouside in a car, in a tent, in an abandoned building, in an overnight shelter, or couch-surfing.) Yes   Are you worried about losing your housing? No   Lack of transportation? No   Unable to get utilities (heat,electricity)? No            9/3/2024   Fall Risk   Fallen 2 or more times in the past year? No   Trouble with walking or balance? No             9/3/2024   Dental   Dentist two times every year? Yes            9/3/2024   TB Screening   Were you born outside of the [place]? No          Today's PHQ-9 Score:       9/3/2024    10:13 PM   PHQ-9 SCORE   PHQ-9 Total Score MyChart 8 (Mild depression)   PHQ-9 Total Score 8         9/3/2024   Substance Use   Alcohol more than 3/day or more than 7/wk No   Do you use any other substances recreationally? 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 (!) YES         History of abnormal Pap smear:         Latest Ref Rng & Units 12/8/2021    10:03 AM 10/4/2018    11:11 AM 10/4/2018    11:02 AM   PAP / HPV   PAP  Negative for Intraepithelial Lesion or Malignancy (NILM)      PAP (Historical)    NIL    HPV 16 DNA Negative Negative  Negative     HPV 18 DNA Negative Negative  Negative     Other HR HPV Negative Negative  Negative       ASCVD Risk   The 10-year ASCVD risk score (Migue CEDILLO, et al., 2019) is: 1.2%    Values used to calculate the score:      Age: 54 years      Sex: Female      Is Non- : No      Diabetic: No      Tobacco smoker: No      Systolic Blood Pressure: 124 mmHg      Is BP treated: No      HDL Cholesterol: 66 mg/dL      Total Cholesterol: 204 mg/dL           Reviewed and updated as needed this visit by Provider                          Review of Systems  Constitutional, HEENT, cardiovascular, pulmonary, GI, , musculoskeletal, neuro, skin, endocrine and psych systems are negative, except as otherwise noted.     Objective    Exam  /83   Pulse 83   Temp 98.3  F (36.8  C) (Oral)   Resp 15   Ht 1.67 m (5' 5.75\")   Wt 64.6 kg (142 lb 8 oz)   LMP  (LMP Unknown)   SpO2 99%   BMI 23.18 kg/m     Estimated body mass index is 23.18 kg/m  as calculated from the following:    Height as of this encounter: 1.67 m (5' 5.75\").    Weight as of this encounter: 64.6 kg (142 lb 8 oz).    Physical Exam  Constitutional:       Appearance: Normal appearance.   HENT:      Head: Normocephalic and atraumatic.      Nose: Nose normal.      Mouth/Throat:      Mouth: Mucous membranes are moist.      Pharynx: Oropharynx is clear.   Eyes:      Extraocular Movements: Extraocular movements intact.      Conjunctiva/sclera: " Conjunctivae normal.   Cardiovascular:      Rate and Rhythm: Normal rate and regular rhythm.      Heart sounds: Normal heart sounds.   Pulmonary:      Effort: Pulmonary effort is normal.      Breath sounds: Normal breath sounds.   Abdominal:      General: Abdomen is flat.      Palpations: Abdomen is soft.   Genitourinary:     General: Normal vulva.   Musculoskeletal:         General: Normal range of motion.      Cervical back: Normal range of motion and neck supple.   Skin:     General: Skin is warm.   Neurological:      General: No focal deficit present.      Mental Status: She is alert and oriented to person, place, and time. Mental status is at baseline.   Psychiatric:         Mood and Affect: Mood and affect normal.         Speech: Speech normal.         Behavior: Behavior normal. Behavior is cooperative.         Thought Content: Thought content normal.         Cognition and Memory: Cognition normal.         Judgment: Judgment normal.               Signed Electronically by: Brenda Walden MD    Answers submitted by the patient for this visit:  Patient Health Questionnaire (Submitted on 9/3/2024)  If you checked off any problems, how difficult have these problems made it for you to do your work, take care of things at home, or get along with other people?: Not difficult at all  PHQ9 TOTAL SCORE: 8

## 2024-09-04 NOTE — PATIENT INSTRUCTIONS
Patient Education   Preventive Care Advice   This is general advice given by our system to help you stay healthy. However, your care team may have specific advice just for you. Please talk to your care team about your preventive care needs.  Nutrition  Eat 5 or more servings of fruits and vegetables each day.  Try wheat bread, brown rice and whole grain pasta (instead of white bread, rice, and pasta).  Get enough calcium and vitamin D. Check the label on foods and aim for 100% of the RDA (recommended daily allowance).  Lifestyle  Exercise at least 150 minutes each week  (30 minutes a day, 5 days a week).  Do muscle strengthening activities 2 days a week. These help control your weight and prevent disease.  No smoking.  Wear sunscreen to prevent skin cancer.  Have a dental exam and cleaning every 6 months.  Yearly exams  See your health care team every year to talk about:  Any changes in your health.  Any medicines your care team has prescribed.  Preventive care, family planning, and ways to prevent chronic diseases.  Shots (vaccines)   HPV shots (up to age 26), if you've never had them before.  Hepatitis B shots (up to age 59), if you've never had them before.  COVID-19 shot: Get this shot when it's due.  Flu shot: Get a flu shot every year.  Tetanus shot: Get a tetanus shot every 10 years.  Pneumococcal, hepatitis A, and RSV shots: Ask your care team if you need these based on your risk.  Shingles shot (for age 50 and up)  General health tests  Diabetes screening:  Starting at age 35, Get screened for diabetes at least every 3 years.  If you are younger than age 35, ask your care team if you should be screened for diabetes.  Cholesterol test: At age 39, start having a cholesterol test every 5 years, or more often if advised.  Bone density scan (DEXA): At age 50, ask your care team if you should have this scan for osteoporosis (brittle bones).  Hepatitis C: Get tested at least once in your life.  STIs (sexually  transmitted infections)  Before age 24: Ask your care team if you should be screened for STIs.  After age 24: Get screened for STIs if you're at risk. You are at risk for STIs (including HIV) if:  You are sexually active with more than one person.  You don't use condoms every time.  You or a partner was diagnosed with a sexually transmitted infection.  If you are at risk for HIV, ask about PrEP medicine to prevent HIV.  Get tested for HIV at least once in your life, whether you are at risk for HIV or not.  Cancer screening tests  Cervical cancer screening: If you have a cervix, begin getting regular cervical cancer screening tests starting at age 21.  Breast cancer scan (mammogram): If you've ever had breasts, begin having regular mammograms starting at age 40. This is a scan to check for breast cancer.  Colon cancer screening: It is important to start screening for colon cancer at age 45.  Have a colonoscopy test every 10 years (or more often if you're at risk) Or, ask your provider about stool tests like a FIT test every year or Cologuard test every 3 years.  To learn more about your testing options, visit:   .  For help making a decision, visit:   https://bit.ly/ii65214.  Prostate cancer screening test: If you have a prostate, ask your care team if a prostate cancer screening test (PSA) at age 55 is right for you.  Lung cancer screening: If you are a current or former smoker ages 50 to 80, ask your care team if ongoing lung cancer screenings are right for you.  For informational purposes only. Not to replace the advice of your health care provider. Copyright   2023 [facility]. All rights reserved. Clinically reviewed by the [facility]itions Program. [employer] 047797 - REV 01/24.  Learning About Depression Screening  What is depression screening?  Depression screening is a way to see if you have depression symptoms. It may be done by a doctor or counselor. It's often part of a routine  "checkup. That's because your mental health is just as important as your physical health.  Depression is a mental health condition that affects how you feel, think, and act. You may:  Have less energy.  Lose interest in your daily activities.  Feel sad and grouchy for a long time.  Depression is very common. It affects people of all ages.  Many things can lead to depression. Some people become depressed after they have a stroke or find out they have a major illness like cancer or heart disease. The death of a loved one or a breakup may lead to depression. It can run in families. Most experts believe that a combination of inherited genes and stressful life events can cause it.  What happens during screening?  You may be asked to fill out a form about your depression symptoms. You and the doctor will discuss your answers. The doctor may ask you more questions to learn more about how you think, act, and feel.  What happens after screening?  If you have symptoms of depression, your doctor will talk to you about your options.  Doctors usually treat depression with medicines or counseling. Often, combining the two works best. Many people don't get help because they think that they'll get over the depression on their own. But people with depression may not get better unless they get treatment.  The cause of depression is not well understood. There may be many factors involved. But if you have depression, it's not your fault.  A serious symptom of depression is thinking about death or suicide. If you or someone you care about talks about this or about feeling hopeless, get help right away.  It's important to know that depression can be treated. Medicine, counseling, and self-care may help.  Where can you learn more?  Go to https://www.[employer].net/patiented  Enter T185 in the search box to learn more about \"Learning About Depression Screening.\"  Current as of: June 24, 2023  Content Version: 14.1 2006-2024 [employer] " [employer].   Care instructions adapted under license by your healthcare professional. If you have questions about a medical condition or this instruction, always ask your healthcare professional. [employer], Incorporated disclaims any warranty or liability for your use of this information.    Substance Use Disorder: Care Instructions  Overview     You can improve your life and health by stopping your use of alcohol or drugs. When you don't drink or use drugs, you may feel and sleep better. You may get along better with your family, friends, and coworkers. There are medicines and programs that can help with substance use disorder.  How can you care for yourself at home?  Here are some ways to help you stay sober and prevent relapse.  If you have been given medicine to help keep you sober or reduce your cravings, be sure to take it exactly as prescribed.  Talk to your doctor about programs that can help you stop using drugs or drinking alcohol.  Do not keep alcohol or drugs in your home.  Plan ahead. Think about what you'll say if other people ask you to drink or use drugs. Try not to spend time with people who drink or use drugs.  Use the time and money spent on drinking or drugs to do something that's important to you.  Preventing a relapse  Have a plan to deal with relapse. Learn to recognize changes in your thinking that lead you to drink or use drugs. Get help before you start to drink or use drugs again.  Try to stay away from situations, friends, or places that may lead you to drink or use drugs.  If you feel the need to drink alcohol or use drugs again, seek help right away. Call a trusted friend or family member. Some people get support from organizations such as Narcotics Anonymous or [employer] or from treatment facilities.  If you relapse, get help as soon as you can. Some people make a plan with another person that outlines what they want that person to do for them if they relapse. The plan usually  includes how to handle the relapse and who to notify in case of relapse.  Don't give up. Remember that a relapse doesn't mean that you have failed. Use the experience to learn the triggers that lead you to drink or use drugs. Then quit again. Recovery is a lifelong process. Many people have several relapses before they are able to quit for good.  Follow-up care is a key part of your treatment and safety. Be sure to make and go to all appointments, and call your doctor if you are having problems. It's also a good idea to know your test results and keep a list of the medicines you take.  When should you call for help?   Call 911  anytime you think you may need emergency care. For example, call if you or someone else:    Has overdosed or has withdrawal signs. Be sure to tell the emergency workers that you are or someone else is using or trying to quit using drugs. Overdose or withdrawal signs may include:  Losing consciousness.  Seizure.  Seeing or hearing things that aren't there (hallucinations).     Is thinking or talking about suicide or harming others.   Where to get help 24 hours a day, 7 days a week   If you or someone you know talks about suicide, self-harm, a mental health crisis, a substance use crisis, or any other kind of emotional distress, get help right away. You can:    Call the Suicide and Crisis Lifeline at 772.     Call 2-073-749-QBEH (1-787.863.9199).     Text HOME to 826989 to access the Crisis Text Line.   Consider saving these numbers in your phone.  Go to [employer].org for more information or to chat online.  Call your doctor now or seek immediate medical care if:    You are having withdrawal symptoms. These may include nausea or vomiting, sweating, shakiness, and anxiety.   Watch closely for changes in your health, and be sure to contact your doctor if:    You have a relapse.     You need more help or support to stop.   Where can you learn more?  Go to  "https://www.[employer].net/patiented  Enter H573 in the search box to learn more about \"Substance Use Disorder: Care Instructions.\"  Current as of: November 15, 2023               Content Version: 14.0    3761-3401 [employer].   Care instructions adapted under license by your healthcare professional. If you have questions about a medical condition or this instruction, always ask your healthcare professional. [employer] disclaims any warranty or liability for your use of this information.      Safer Sex: Care Instructions  Overview  Safer sex is a way to reduce your risk of getting a sexually transmitted infection (STI). It can also help prevent pregnancy.  Several products can help you practice safer sex and reduce your chance of STIs. One of the best is a condom. There are internal and external condoms. You can use a special rubber sheet (dental dam) for protection during oral sex. Disposable gloves can keep your hands from touching blood, semen, or other body fluids that can carry infections.  Remember that birth control methods such as diaphragms, IUDs, foams, and birth control pills do not stop you from getting STIs.  Follow-up care is a key part of your treatment and safety. Be sure to make and go to all appointments, and call your doctor if you are having problems. It's also a good idea to know your test results and keep a list of the medicines you take.  How can you care for yourself at home?  Think about getting vaccinated to help prevent hepatitis A, hepatitis B, and human papillomavirus (HPV). They can be spread through sex.  Use a condom every time you have sex. Use an external condom, which goes on the penis. Or use an internal condom, which goes into the vagina or anus.  Make sure you use the right size external condom. A condom that's too small can break easily. A condom that's too big can slip off during sex.  Use a new condom each time you have sex. Be careful not to poke " "a hole in the condom when you open the wrapper.  Don't use an internal condom and an external condom at the same time.  Never use petroleum jelly (such as Vaseline), grease, hand lotion, baby oil, or anything with oil in it. These products can make holes in the condom.  After intercourse, hold the edge of the condom as you remove it. This will help keep semen from spilling out of the condom.  Do not have sex with anyone who has symptoms of an STI, such as sores on the genitals or mouth.  Do not drink a lot of alcohol or use drugs before sex.  Limit your sex partners. Sex with one partner who has sex only with you can reduce your risk of getting an STI.  Don't share sex toys. But if you do share them, use a condom and clean the sex toys between each use.  Talk to any partners before you have sex. Talk about what you feel comfortable with and whether you have any boundaries with sex. And find out if your partner or partners may be at risk for any STI. Keep in mind that a person may be able to spread an STI even if they do not have symptoms. You and any partners may want to get tested for STIs.  Where can you learn more?  Go to https://www.[employer].net/patiented  Enter B608 in the search box to learn more about \"Safer Sex: Care Instructions.\"  Current as of: November 27, 2023               Content Version: 14.0    5971-4219 [employer].   Care instructions adapted under license by your healthcare professional. If you have questions about a medical condition or this instruction, always ask your healthcare professional. [employer] disclaims any warranty or liability for your use of this information.         "

## 2024-09-05 LAB
ALBUMIN SERPL BCG-MCNC: 4.8 G/DL (ref 3.5–5.2)
ALP SERPL-CCNC: 117 U/L (ref 40–150)
ALT SERPL W P-5'-P-CCNC: 50 U/L (ref 0–50)
ANION GAP SERPL CALCULATED.3IONS-SCNC: 10 MMOL/L (ref 7–15)
AST SERPL W P-5'-P-CCNC: 38 U/L (ref 0–45)
BILIRUB SERPL-MCNC: 0.2 MG/DL
BUN SERPL-MCNC: 15.3 MG/DL (ref 6–20)
C TRACH DNA SPEC QL PROBE+SIG AMP: NEGATIVE
CALCIUM SERPL-MCNC: 9.8 MG/DL (ref 8.8–10.4)
CHLORIDE SERPL-SCNC: 104 MMOL/L (ref 98–107)
CHOLEST SERPL-MCNC: 320 MG/DL
CREAT SERPL-MCNC: 0.85 MG/DL (ref 0.51–0.95)
EGFRCR SERPLBLD CKD-EPI 2021: 82 ML/MIN/1.73M2
FASTING STATUS PATIENT QL REPORTED: ABNORMAL
FASTING STATUS PATIENT QL REPORTED: NORMAL
GLUCOSE SERPL-MCNC: 99 MG/DL (ref 70–99)
HCO3 SERPL-SCNC: 28 MMOL/L (ref 22–29)
HCV AB SERPL QL IA: NONREACTIVE
HDLC SERPL-MCNC: 63 MG/DL
HIV 1+2 AB+HIV1 P24 AG SERPL QL IA: NONREACTIVE
HPV HR 12 DNA CVX QL NAA+PROBE: NEGATIVE
HPV16 DNA CVX QL NAA+PROBE: NEGATIVE
HPV18 DNA CVX QL NAA+PROBE: NEGATIVE
HUMAN PAPILLOMA VIRUS FINAL DIAGNOSIS: NORMAL
LDLC SERPL CALC-MCNC: ABNORMAL MG/DL
LDLC SERPL DIRECT ASSAY-MCNC: 199 MG/DL
N GONORRHOEA DNA SPEC QL NAA+PROBE: NEGATIVE
NONHDLC SERPL-MCNC: 257 MG/DL
POTASSIUM SERPL-SCNC: 5 MMOL/L (ref 3.4–5.3)
PROT SERPL-MCNC: 7.8 G/DL (ref 6.4–8.3)
SODIUM SERPL-SCNC: 142 MMOL/L (ref 135–145)
TRIGL SERPL-MCNC: 466 MG/DL

## 2024-09-09 LAB
BKR AP ASSOCIATED HPV REPORT: NORMAL
BKR LAB AP GYN ADEQUACY: NORMAL
BKR LAB AP GYN INTERPRETATION: NORMAL
BKR LAB AP PREVIOUS ABNORMAL: NORMAL
PATH REPORT.COMMENTS IMP SPEC: NORMAL
PATH REPORT.COMMENTS IMP SPEC: NORMAL
PATH REPORT.RELEVANT HX SPEC: NORMAL

## 2024-11-07 ENCOUNTER — TELEPHONE (OUTPATIENT)
Dept: FAMILY MEDICINE | Facility: CLINIC | Age: 54
End: 2024-11-07

## 2024-11-07 NOTE — TELEPHONE ENCOUNTER
Patient Quality Outreach    Patient is due for the following:   Colon Cancer Screening  Breast Cancer Screening - Mammogram    Next Steps:   No follow up needed at this time.    Type of outreach:    Sent [employer] message.      Questions for provider review:    None           Miriam Dos Santos MA

## 2025-02-12 ENCOUNTER — OFFICE VISIT (OUTPATIENT)
Dept: FAMILY MEDICINE | Facility: CLINIC | Age: 55
End: 2025-02-12
Payer: COMMERCIAL

## 2025-02-12 ENCOUNTER — TELEPHONE (OUTPATIENT)
Dept: FAMILY MEDICINE | Facility: CLINIC | Age: 55
End: 2025-02-12

## 2025-02-12 VITALS
DIASTOLIC BLOOD PRESSURE: 85 MMHG | SYSTOLIC BLOOD PRESSURE: 142 MMHG | BODY MASS INDEX: 23.59 KG/M2 | HEIGHT: 66 IN | OXYGEN SATURATION: 97 % | HEART RATE: 77 BPM | WEIGHT: 146.8 LBS | TEMPERATURE: 98.3 F | RESPIRATION RATE: 20 BRPM

## 2025-02-12 DIAGNOSIS — B00.1 COLD SORE: Primary | ICD-10-CM

## 2025-02-12 PROCEDURE — 99213 OFFICE O/P EST LOW 20 MIN: CPT | Performed by: PHYSICIAN ASSISTANT

## 2025-02-12 PROCEDURE — G2211 COMPLEX E/M VISIT ADD ON: HCPCS | Performed by: PHYSICIAN ASSISTANT

## 2025-02-12 RX ORDER — ACYCLOVIR 400 MG/1
400 TABLET ORAL EVERY 8 HOURS
Qty: 15 TABLET | Refills: 1 | Status: SHIPPED | OUTPATIENT
Start: 2025-02-12 | End: 2025-02-17

## 2025-02-12 ASSESSMENT — PATIENT HEALTH QUESTIONNAIRE - PHQ9
SUM OF ALL RESPONSES TO PHQ QUESTIONS 1-9: 4
10. IF YOU CHECKED OFF ANY PROBLEMS, HOW DIFFICULT HAVE THESE PROBLEMS MADE IT FOR YOU TO DO YOUR WORK, TAKE CARE OF THINGS AT HOME, OR GET ALONG WITH OTHER PEOPLE: NOT DIFFICULT AT ALL
SUM OF ALL RESPONSES TO PHQ QUESTIONS 1-9: 4

## 2025-02-12 NOTE — PROGRESS NOTES
"  Assessment & Plan     Cold sore    She feels a cold sore coming on. Has been prescribed acycylovir in the past which she feels works well. Needed a visit today because her primary care provider is on an extended leave of absence and hasn't discussed with anyone else. Sent today with 1 refill to use in case of repeat flare.    - acyclovir (ZOVIRAX) 400 MG tablet; Take 1 tablet (400 mg) by mouth every 8 hours for 5 days.      The longitudinal plan of care for the diagnosis(es)/condition(s) as documented were addressed during this visit. Due to the added complexity in care, I will continue to support Narda in the subsequent management and with ongoing continuity of care.          Violet Laboy is a 54 year old, presenting for the following health issues:  Medication Request        2/12/2025     2:04 PM   Additional Questions   Roomed by Sana Vieyra     History of Present Illness       Reason for visit:  Cold sore    She eats 4 or more servings of fruits and vegetables daily.She consumes 0 sweetened beverage(s) daily.She exercises with enough effort to increase her heart rate 30 to 60 minutes per day.  She exercises with enough effort to increase her heart rate 6 days per week.   She is taking medications regularly.       Patient has tingly and chapped lips on 2/11/2025. She gets cold sores. She would like a refill for Acyclovir. She has gotten this prescribed by Dr. Bob.      Review of Systems  Constitutional, HEENT, cardiovascular, pulmonary, gi and gu systems are negative, except as otherwise noted.        Objective    BP (!) 142/85 (BP Location: Right arm, Patient Position: Sitting, Cuff Size: Adult Regular)   Pulse 77   Temp 98.3  F (36.8  C) (Oral)   Resp 20   Ht 1.664 m (5' 5.5\")   Wt 66.6 kg (146 lb 12.8 oz)   LMP  (LMP Unknown)   SpO2 97%   BMI 24.06 kg/m    Body mass index is 24.06 kg/m .      Physical Exam   GENERAL: alert and no distress  EYES: Eyes grossly normal to inspection, PERRL and " conjunctivae and sclerae normal  MS: no gross musculoskeletal defects noted, no edema  SKIN: no suspicious lesions or rashes  NEURO: Normal strength and tone, mentation intact and speech normal  PSYCH: mentation appears normal, affect normal/bright            Signed Electronically by: Reji Cortes PA-C

## 2025-02-12 NOTE — TELEPHONE ENCOUNTER
Pt calls.    She said Dr. Bob used to prescribe acyclovir for her for cold sores.  She has not had a prescription for acyclovir since 2021.  Dr. Bob is out on leave.  Advised she will need some kind of visit.  She does not want to do an e-visit.  She can't find her password for [employer].  She is tired.  She just got back from vacation.  She does not want to do a video visit because she can't get into her [employer].  She scheduled an appt in [place] within 30 minutes.    Appointments in Next Year      Feb 12, 2025 2:30 PM  (Arrive by 2:10 PM)  Provider Visit with Reji Cortes PA-C  [facility] ([facility] [place] ) [phone]

## 2025-03-21 ENCOUNTER — ANCILLARY PROCEDURE (OUTPATIENT)
Dept: MAMMOGRAPHY | Facility: CLINIC | Age: 55
End: 2025-03-21
Payer: COMMERCIAL

## 2025-03-21 DIAGNOSIS — Z12.31 VISIT FOR SCREENING MAMMOGRAM: ICD-10-CM

## 2025-03-21 PROBLEM — F13.20 SEDATIVE, HYPNOTIC OR ANXIOLYTIC DEPENDENCE (H): Status: RESOLVED | Noted: 2017-10-27 | Resolved: 2025-03-21

## 2025-03-21 PROCEDURE — 77063 BREAST TOMOSYNTHESIS BI: CPT | Mod: TC | Performed by: RADIOLOGY

## 2025-03-21 PROCEDURE — 77067 SCR MAMMO BI INCL CAD: CPT | Mod: TC | Performed by: RADIOLOGY

## 2025-06-21 NOTE — TELEPHONE ENCOUNTER
APPROVED    Start date- 11/29/17  End date- 11/29/20  Pharm informed.    LUBNA JayT      
Insurance Requires Prior Authorization    Provider:  Pricilla Bob  Phone #: [phone]  ID#: [id number]  BIN#:[id number]  PCN#:[id number]  Medication/SIG: zolpidem (AMBIEN) 5 MG tablet  1 tab QD    Pharmacy: [facility]    Prior auth submitted through cover my meds.    ZAYRA Jay  November 29, 2017  2:58 PM      
None

## (undated) DEVICE — SOL WATER IRRIG 1000ML BOTTLE 2F7114

## (undated) DEVICE — SOL NACL 0.9% IRRIG 3000ML BAG 2B7477

## (undated) DEVICE — BASKET STONE RETRIEVAL NTNL ZERO TIP 1.9FRX90CM M0063901050

## (undated) DEVICE — GUIDEWIRE SENSOR DUAL FLEX STR 0.035"X150CM M0066703080

## (undated) DEVICE — GLOVE PROTEXIS MICRO 7.0  2D73PM70

## (undated) DEVICE — GLOVE PROTEXIS BLUE W/NEU-THERA 7.0  2D73EB70

## (undated) DEVICE — CATH URETERAL OPEN END 6FR AXXCESS

## (undated) DEVICE — PACK CYSTOSCOPY SBA15CYFSI

## (undated) DEVICE — PAD CHUX UNDERPAD 23X24" 7136

## (undated) RX ORDER — CEFAZOLIN SODIUM 2 G/100ML
INJECTION, SOLUTION INTRAVENOUS
Status: DISPENSED
Start: 2021-12-06

## (undated) RX ORDER — PROPOFOL 10 MG/ML
INJECTION, EMULSION INTRAVENOUS
Status: DISPENSED
Start: 2021-12-06

## (undated) RX ORDER — ONDANSETRON 2 MG/ML
INJECTION INTRAMUSCULAR; INTRAVENOUS
Status: DISPENSED
Start: 2021-12-06

## (undated) RX ORDER — FENTANYL CITRATE 50 UG/ML
INJECTION, SOLUTION INTRAMUSCULAR; INTRAVENOUS
Status: DISPENSED
Start: 2021-12-06

## (undated) RX ORDER — DEXAMETHASONE SODIUM PHOSPHATE 4 MG/ML
INJECTION, SOLUTION INTRA-ARTICULAR; INTRALESIONAL; INTRAMUSCULAR; INTRAVENOUS; SOFT TISSUE
Status: DISPENSED
Start: 2021-12-06